# Patient Record
Sex: FEMALE | Race: WHITE | Employment: OTHER | URBAN - METROPOLITAN AREA
[De-identification: names, ages, dates, MRNs, and addresses within clinical notes are randomized per-mention and may not be internally consistent; named-entity substitution may affect disease eponyms.]

---

## 2018-02-06 PROCEDURE — 82746 ASSAY OF FOLIC ACID SERUM: CPT | Performed by: OTHER

## 2018-02-06 PROCEDURE — 82164 ANGIOTENSIN I ENZYME TEST: CPT | Performed by: OTHER

## 2018-02-06 PROCEDURE — 86225 DNA ANTIBODY NATIVE: CPT | Performed by: OTHER

## 2018-02-06 PROCEDURE — 86235 NUCLEAR ANTIGEN ANTIBODY: CPT | Performed by: OTHER

## 2018-02-06 PROCEDURE — 86618 LYME DISEASE ANTIBODY: CPT | Performed by: OTHER

## 2018-02-06 PROCEDURE — 86780 TREPONEMA PALLIDUM: CPT | Performed by: OTHER

## 2018-02-06 PROCEDURE — 82607 VITAMIN B-12: CPT | Performed by: OTHER

## 2018-04-05 PROBLEM — G31.84 MCI (MILD COGNITIVE IMPAIRMENT): Status: ACTIVE | Noted: 2018-04-05

## 2018-06-21 PROBLEM — F90.9 ATTENTION DEFICIT HYPERACTIVITY DISORDER (ADHD), UNSPECIFIED ADHD TYPE: Status: ACTIVE | Noted: 2018-06-21

## 2019-12-10 ENCOUNTER — TELEPHONE (OUTPATIENT)
Dept: NEPHROLOGY | Facility: CLINIC | Age: 54
End: 2019-12-10

## 2020-08-03 ENCOUNTER — HOSPITAL ENCOUNTER (OUTPATIENT)
Dept: GENERAL RADIOLOGY | Age: 55
Discharge: HOME | End: 2020-08-03
Attending: PHYSICAL MEDICINE & REHABILITATION
Payer: MEDICARE

## 2020-08-03 ENCOUNTER — OFFICE VISIT (OUTPATIENT)
Dept: NEUROLOGY | Facility: CLINIC | Age: 55
End: 2020-08-03
Payer: MEDICARE

## 2020-08-03 VITALS — WEIGHT: 141 LBS | HEIGHT: 67 IN | BODY MASS INDEX: 22.13 KG/M2

## 2020-08-03 DIAGNOSIS — G89.29 CHRONIC MIDLINE THORACIC BACK PAIN: ICD-10-CM

## 2020-08-03 DIAGNOSIS — F32.A DEPRESSION, UNSPECIFIED DEPRESSION TYPE: ICD-10-CM

## 2020-08-03 DIAGNOSIS — C50.919 MALIGNANT NEOPLASM OF FEMALE BREAST, UNSPECIFIED ESTROGEN RECEPTOR STATUS, UNSPECIFIED LATERALITY, UNSPECIFIED SITE OF BREAST (HCC): ICD-10-CM

## 2020-08-03 DIAGNOSIS — F90.9 ATTENTION DEFICIT HYPERACTIVITY DISORDER (ADHD), UNSPECIFIED ADHD TYPE: ICD-10-CM

## 2020-08-03 DIAGNOSIS — F41.9 ANXIETY: ICD-10-CM

## 2020-08-03 DIAGNOSIS — M54.6 CHRONIC MIDLINE THORACIC BACK PAIN: ICD-10-CM

## 2020-08-03 DIAGNOSIS — M47.816 LUMBAR SPONDYLOSIS: Primary | ICD-10-CM

## 2020-08-03 DIAGNOSIS — K25.7 CHRONIC GASTRIC ULCER WITHOUT HEMORRHAGE AND WITHOUT PERFORATION: ICD-10-CM

## 2020-08-03 DIAGNOSIS — G47.00 INSOMNIA, UNSPECIFIED TYPE: ICD-10-CM

## 2020-08-03 DIAGNOSIS — M47.816 LUMBAR SPONDYLOSIS: ICD-10-CM

## 2020-08-03 DIAGNOSIS — I25.10 CORONARY ARTERY DISEASE INVOLVING NATIVE CORONARY ARTERY OF NATIVE HEART WITHOUT ANGINA PECTORIS: ICD-10-CM

## 2020-08-03 PROBLEM — G43.709 CHRONIC MIGRAINE WITHOUT AURA: Status: ACTIVE | Noted: 2020-08-03

## 2020-08-03 PROBLEM — Z87.891 HISTORY OF TOBACCO USE: Status: ACTIVE | Noted: 2020-08-03

## 2020-08-03 PROBLEM — K25.9 GASTRIC ULCER: Status: ACTIVE | Noted: 2020-08-03

## 2020-08-03 PROCEDURE — 99204 OFFICE O/P NEW MOD 45 MIN: CPT | Performed by: PHYSICAL MEDICINE & REHABILITATION

## 2020-08-03 PROCEDURE — 72072 X-RAY EXAM THORAC SPINE 3VWS: CPT | Performed by: PHYSICAL MEDICINE & REHABILITATION

## 2020-08-03 PROCEDURE — 72100 X-RAY EXAM L-S SPINE 2/3 VWS: CPT | Performed by: PHYSICAL MEDICINE & REHABILITATION

## 2020-08-03 RX ORDER — ACETAMINOPHEN AND CODEINE PHOSPHATE 300; 30 MG/1; MG/1
1 TABLET ORAL 3 TIMES DAILY PRN
COMMUNITY
Start: 2020-05-22 | End: 2020-08-03

## 2020-08-03 RX ORDER — DEXTROAMPHETAMINE SACCHARATE, AMPHETAMINE ASPARTATE, DEXTROAMPHETAMINE SULFATE AND AMPHETAMINE SULFATE 5; 5; 5; 5 MG/1; MG/1; MG/1; MG/1
1 TABLET ORAL 2 TIMES DAILY
COMMUNITY
Start: 2020-07-14

## 2020-08-03 RX ORDER — AMITRIPTYLINE HYDROCHLORIDE 25 MG/1
25 TABLET, FILM COATED ORAL NIGHTLY
COMMUNITY
End: 2020-08-03

## 2020-08-03 RX ORDER — FAMOTIDINE 20 MG/1
TABLET ORAL
COMMUNITY
Start: 2020-04-07 | End: 2020-08-03

## 2020-08-03 RX ORDER — LIDOCAINE 5 %
ADHESIVE PATCH, MEDICATED TOPICAL
COMMUNITY
Start: 2020-07-06

## 2020-08-03 RX ORDER — LORAZEPAM 1 MG/1
1 TABLET ORAL DAILY
COMMUNITY
Start: 2012-01-18 | End: 2020-08-03

## 2020-08-03 RX ORDER — RIVASTIGMINE 9.5 MG/24H
PATCH, EXTENDED RELEASE TRANSDERMAL
COMMUNITY
Start: 2018-06-22 | End: 2020-08-03

## 2020-08-03 RX ORDER — MONTELUKAST SODIUM 10 MG/1
10 TABLET ORAL NIGHTLY
COMMUNITY
Start: 2020-03-05

## 2020-08-03 RX ORDER — TRAZODONE HYDROCHLORIDE 100 MG/1
TABLET ORAL
COMMUNITY
Start: 2020-05-26

## 2020-08-03 RX ORDER — LEVOTHYROXINE SODIUM 112 MCG
112 TABLET ORAL DAILY
COMMUNITY
Start: 2020-07-14

## 2020-08-03 RX ORDER — MECLIZINE HYDROCHLORIDE 25 MG/1
25 TABLET ORAL 3 TIMES DAILY
COMMUNITY
Start: 2020-03-23

## 2020-08-03 RX ORDER — L-METHYLFOLATE-ALGAE-VIT B12-B6 CAP 3-90.314-2-35 MG 3-90.314-2-35 MG
CAP ORAL
COMMUNITY
End: 2020-08-03

## 2020-08-03 RX ORDER — BUTALBITAL/ASPIRIN/CAFFEINE 50-325-40
1 CAPSULE ORAL
COMMUNITY

## 2020-08-03 RX ORDER — BUPROPION HYDROCHLORIDE 75 MG/1
75 TABLET ORAL DAILY
COMMUNITY
Start: 2020-04-26 | End: 2020-08-03

## 2020-08-03 RX ORDER — OSELTAMIVIR PHOSPHATE 75 MG/1
75 CAPSULE ORAL 2 TIMES DAILY
COMMUNITY
Start: 2020-03-12

## 2020-08-03 RX ORDER — ASPIRIN 81 MG/1
TABLET, CHEWABLE ORAL
COMMUNITY
Start: 2018-10-05

## 2020-08-03 RX ORDER — DULOXETIN HYDROCHLORIDE 60 MG/1
60 CAPSULE, DELAYED RELEASE ORAL DAILY
COMMUNITY
End: 2020-08-03

## 2020-08-03 RX ORDER — AMLODIPINE BESYLATE 5 MG/1
5 TABLET ORAL DAILY
COMMUNITY
End: 2020-08-03 | Stop reason: SINTOL

## 2020-08-03 RX ORDER — HYDROCHLOROTHIAZIDE 25 MG/1
25 TABLET ORAL EVERY MORNING
COMMUNITY
Start: 2020-06-08

## 2020-08-03 RX ORDER — CLONAZEPAM 0.5 MG/1
TABLET ORAL 2 TIMES DAILY
COMMUNITY
Start: 2020-03-20

## 2020-08-03 RX ORDER — CYCLOBENZAPRINE HCL 10 MG
10 TABLET ORAL 3 TIMES DAILY
COMMUNITY
Start: 2020-05-12

## 2020-08-03 RX ORDER — HYDROCODONE BITARTRATE AND IBUPROFEN 7.5; 2 MG/1; MG/1
TABLET, FILM COATED ORAL
COMMUNITY
Start: 2020-07-14 | End: 2020-08-03

## 2020-08-03 RX ORDER — LUBIPROSTONE 24 UG/1
CAPSULE, GELATIN COATED ORAL
COMMUNITY
Start: 2015-01-16

## 2020-08-03 RX ORDER — PROPRANOLOL HYDROCHLORIDE 10 MG/1
TABLET ORAL
COMMUNITY
Start: 2020-06-29

## 2020-08-03 RX ORDER — NIFEDIPINE 30 MG/1
30 TABLET, EXTENDED RELEASE ORAL DAILY
COMMUNITY
Start: 2020-04-02 | End: 2020-08-03

## 2020-08-03 RX ORDER — FLUTICASONE PROPIONATE 50 MCG
SPRAY, SUSPENSION (ML) NASAL
COMMUNITY
Start: 2020-05-12

## 2020-08-03 NOTE — PROGRESS NOTES
[address]  Progress Note    CHIEF COMPLAINT:  Patient presents with:  Low Back Pain: New patient from previous office, presents for injection evaluation.        History of Present Illness:  Reford Lobe Smoking status: Current Every Day Smoker        Packs/day: 0.50      Smokeless tobacco: Never Used    Substance and Sexual Activity      Alcohol use: Yes        Comment: occasional      Drug use: No      Sexual activity: Not on file      FAMILY HISTORY: • Meclizine HCl 25 MG Oral Tab Take 25 mg by mouth 3 (three) times daily. take 88 mcg by mouth. • Montelukast Sodium 10 MG Oral Tab Take 10 mg by mouth nightly. • Oseltamivir Phosphate 75 MG Oral Cap Take 75 mg by mouth 2 (two) times daily.      • deformities  Heart: peripheral pulses intact. Normal capillary refill. Lungs: Non-labored respirations  Extremities: No lower extremity edema bilaterally   Skin: No lesions noted. Spine: Tender to palpation in the lumbar paraspinals and midline.   No pe unspecified type      RTC:    Return in about 4 weeks (around 8/31/2020). Discharge Instructions were provided as documented in AVS summary. The patient was in agreement with the assessment and plan. All questions were answered.   There were no bar

## 2020-08-04 ENCOUNTER — TELEPHONE (OUTPATIENT)
Dept: NEUROLOGY | Facility: CLINIC | Age: 55
End: 2020-08-04

## 2020-08-04 DIAGNOSIS — M47.816 LUMBAR SPONDYLOSIS: Primary | ICD-10-CM

## 2020-08-04 NOTE — TELEPHONE ENCOUNTER
Pt has been rescheduled for 8/13/20. Pt would like later time slot if available - marked on scheduling form accordingly. Updated [facility] form has been faxed.      Per pt, she asks why injections are not being done on Thoracic spine as this area is more painful

## 2020-08-04 NOTE — TELEPHONE ENCOUNTER
Spoke to patient and informed her of Dr. Juventino Coats message and interpretation of the imaging results.    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

## 2020-08-04 NOTE — TELEPHONE ENCOUNTER
Patient was scheduled for Bilateral L34, L45 and L5-S1 facet injections with sedation on Thursday, August 6, 2020. Medications and allergies reviewed. Patient informed she will need a .  Patient informed not to eat or drink anything after midnight t

## 2020-08-04 NOTE — TELEPHONE ENCOUNTER
----- Message from Rakesh Epps MD sent at 8/4/2020 11:13 AM CDT -----  I personally reviewed a plain film of the thoracic spine showing minimal scoliotic curvature and widespread disc degeneration. Also some kyphosis.     Please of the patient know t

## 2020-08-04 NOTE — TELEPHONE ENCOUNTER
LMTCB - Per [facility], pt will need to be rescheduled. Unable to accommodate d/t inadequate time to coordinate COVID testing.

## 2020-08-05 NOTE — TELEPHONE ENCOUNTER
Last time she had injections in the thoracic area she had significant complications. That is why we will work on her lumbar spine.

## 2020-08-05 NOTE — TELEPHONE ENCOUNTER
Pt informed of response per Dr. Randee Amezcua, states that was with a different doctor who gave the thoracic injections previously.  Asked if she had complications with thoracic injections with previous physician, pt stated \"well yes, but the last physician who in

## 2020-08-10 ENCOUNTER — LAB REQUISITION (OUTPATIENT)
Dept: LAB | Facility: HOSPITAL | Age: 55
End: 2020-08-10
Payer: MEDICARE

## 2020-08-10 DIAGNOSIS — Z20.828 CONTACT WITH AND (SUSPECTED) EXPOSURE TO OTHER VIRAL COMMUNICABLE DISEASES: ICD-10-CM

## 2020-08-12 LAB — SARS-COV-2 RNA RESP QL NAA+PROBE: NOT DETECTED

## 2020-08-13 ENCOUNTER — OFFICE VISIT (OUTPATIENT)
Dept: SURGERY | Facility: CLINIC | Age: 55
End: 2020-08-13

## 2020-08-13 DIAGNOSIS — M47.816 LUMBAR SPONDYLOSIS: Primary | ICD-10-CM

## 2020-08-13 NOTE — TELEPHONE ENCOUNTER
[id number] Anai Todd will need a follow up to assess her thoracic spine so we know what to inject. Pls have her set up an appt.  Thx

## 2020-08-13 NOTE — TELEPHONE ENCOUNTER
MD SARA Alexis Lose             Case cancelled due to patient GI illness. Please call to reschedule. Thanks. Rodrigo Schuler Spoke to patient and cancelled injection for this morning.  She states that she was throwing up last night and has a low g

## 2020-08-28 ENCOUNTER — MED REC SCAN ONLY (OUTPATIENT)
Dept: NEUROLOGY | Facility: CLINIC | Age: 55
End: 2020-08-28

## 2021-03-20 DIAGNOSIS — Z23 NEED FOR VACCINATION: ICD-10-CM

## 2022-10-05 ENCOUNTER — OFFICE VISIT (OUTPATIENT)
Dept: ENDOCRINOLOGY CLINIC | Facility: CLINIC | Age: 57
End: 2022-10-05
Payer: MEDICARE

## 2022-10-05 ENCOUNTER — LAB ENCOUNTER (OUTPATIENT)
Dept: LAB | Age: 57
End: 2022-10-05
Attending: INTERNAL MEDICINE
Payer: MEDICARE

## 2022-10-05 VITALS
WEIGHT: 148 LBS | DIASTOLIC BLOOD PRESSURE: 78 MMHG | SYSTOLIC BLOOD PRESSURE: 136 MMHG | BODY MASS INDEX: 23 KG/M2 | HEART RATE: 60 BPM

## 2022-10-05 DIAGNOSIS — E89.0 POSTOPERATIVE HYPOTHYROIDISM: ICD-10-CM

## 2022-10-05 DIAGNOSIS — E89.0 POSTOPERATIVE HYPOTHYROIDISM: Primary | ICD-10-CM

## 2022-10-05 DIAGNOSIS — R73.01 IMPAIRED FASTING GLUCOSE: ICD-10-CM

## 2022-10-05 LAB
CARTRIDGE LOT#: NORMAL NUMERIC
CORTIS SERPL-MCNC: 5.9 UG/DL
HEMOGLOBIN A1C: 5.6 % (ref 4.3–5.6)
T3FREE SERPL-MCNC: 2.09 PG/ML (ref 2.4–4.2)
T4 FREE SERPL-MCNC: 1.4 NG/DL (ref 0.8–1.7)
THYROPEROXIDASE AB SERPL-ACNC: 30 U/ML (ref ?–60)
TSI SER-ACNC: 0.79 MIU/ML (ref 0.36–3.74)

## 2022-10-05 PROCEDURE — 82533 TOTAL CORTISOL: CPT

## 2022-10-05 PROCEDURE — 86376 MICROSOMAL ANTIBODY EACH: CPT

## 2022-10-05 PROCEDURE — 99204 OFFICE O/P NEW MOD 45 MIN: CPT | Performed by: INTERNAL MEDICINE

## 2022-10-05 PROCEDURE — 83036 HEMOGLOBIN GLYCOSYLATED A1C: CPT | Performed by: INTERNAL MEDICINE

## 2022-10-05 PROCEDURE — 36415 COLL VENOUS BLD VENIPUNCTURE: CPT

## 2022-10-05 PROCEDURE — 84439 ASSAY OF FREE THYROXINE: CPT

## 2022-10-05 PROCEDURE — 84443 ASSAY THYROID STIM HORMONE: CPT

## 2022-10-05 PROCEDURE — 84481 FREE ASSAY (FT-3): CPT

## 2022-10-05 RX ORDER — LEVOTHYROXINE SODIUM 125 MCG
125 TABLET ORAL DAILY
COMMUNITY
Start: 2022-09-12 | End: 2022-10-07

## 2022-10-06 ENCOUNTER — TELEPHONE (OUTPATIENT)
Dept: ENDOCRINOLOGY CLINIC | Facility: CLINIC | Age: 57
End: 2022-10-06

## 2022-10-06 DIAGNOSIS — E89.0 POSTOPERATIVE HYPOTHYROIDISM: Primary | ICD-10-CM

## 2022-10-06 NOTE — TELEPHONE ENCOUNTER
Please call patient - thyroid labs are normal however I know she was having more symptoms. Change Synthroid to 112mcg PO every day. Start Liothyronine 5mcg PO daily #90, refill 1 in addition to Synthroid. Recheck TSH, FT4, FT3 in 3 months. Thanks.

## 2022-10-06 NOTE — TELEPHONE ENCOUNTER
Dr. Sasha Hamlin to patient regarding result note below. Patient wants to know why her \"recipe\" has changed? I told her lab work is normal. She is wondering if she can stick with:    Synthroid 125 mcg Monday, Thursday Sunday,   Synthroid 112 mcg Tuesday, Wednesday, Friday, Saturday. With Cytomel 5 mcg in addition    She also wants to know what you think of her Cortisol level? I told her this was also normal. She wants to know if it's possible that she has an adrenal tumor. Please advise.

## 2022-10-06 NOTE — TELEPHONE ENCOUNTER
The cortisol level was normal.  I don't see any evidence of an adrenal tumor at this time. Yes, ok to add liothyronine to current recipe and then repeat labs. Thanks.

## 2022-10-07 RX ORDER — LIOTHYRONINE SODIUM 5 UG/1
5 TABLET ORAL DAILY
Qty: 90 TABLET | Refills: 1 | Status: SHIPPED | OUTPATIENT
Start: 2022-10-07

## 2022-10-07 RX ORDER — LEVOTHYROXINE SODIUM 112 MCG
112 TABLET ORAL
Qty: 90 TABLET | Refills: 0 | Status: SHIPPED | OUTPATIENT
Start: 2022-10-07

## 2022-10-07 NOTE — TELEPHONE ENCOUNTER
Spoke to patient regarding message below. Patient stated she changed her mind and wants to try the regimen that Dr. Yadira Benavides originally had suggested in first note below. Prescription sent, and lab orders placed. Patient stated she will contact office if she starts experiencing worsening symptoms.

## 2022-10-20 ENCOUNTER — TELEPHONE (OUTPATIENT)
Dept: ENDOCRINOLOGY CLINIC | Facility: CLINIC | Age: 57
End: 2022-10-20

## 2022-10-20 NOTE — TELEPHONE ENCOUNTER
[facility] fax [facility], attached is lab results for TSH.  Placed on Dr [employer] desk for review

## 2022-11-14 ENCOUNTER — LAB ENCOUNTER (OUTPATIENT)
Dept: LAB | Age: 57
End: 2022-11-14
Attending: INTERNAL MEDICINE
Payer: MEDICARE

## 2022-11-14 ENCOUNTER — TELEPHONE (OUTPATIENT)
Dept: ENDOCRINOLOGY CLINIC | Facility: CLINIC | Age: 57
End: 2022-11-14

## 2022-11-14 DIAGNOSIS — E89.0 POSTOPERATIVE HYPOTHYROIDISM: ICD-10-CM

## 2022-11-14 DIAGNOSIS — E89.0 POSTOPERATIVE HYPOTHYROIDISM: Primary | ICD-10-CM

## 2022-11-14 LAB
T3FREE SERPL-MCNC: 1.97 PG/ML (ref 2.4–4.2)
T4 FREE SERPL-MCNC: 1.4 NG/DL (ref 0.8–1.7)
TSI SER-ACNC: 0.58 MIU/ML (ref 0.36–3.74)

## 2022-11-14 PROCEDURE — 36415 COLL VENOUS BLD VENIPUNCTURE: CPT

## 2022-11-14 PROCEDURE — 84439 ASSAY OF FREE THYROXINE: CPT

## 2022-11-14 PROCEDURE — 84443 ASSAY THYROID STIM HORMONE: CPT

## 2022-11-14 PROCEDURE — 84481 FREE ASSAY (FT-3): CPT

## 2022-11-14 RX ORDER — LEVOTHYROXINE SODIUM 0.12 MG/1
TABLET ORAL
Qty: 90 TABLET | Refills: 0 | Status: SHIPPED | OUTPATIENT
Start: 2022-11-14

## 2022-11-14 RX ORDER — LEVOTHYROXINE SODIUM 112 UG/1
TABLET ORAL
Qty: 90 TABLET | Refills: 0 | Status: SHIPPED | OUTPATIENT
Start: 2022-11-14

## 2022-11-14 NOTE — TELEPHONE ENCOUNTER
Ok, noted. Please stop Liothyronine. Go back to her previous regimen which was Synthroid 125mcg 3 days per week and 112mcg 4 days per week. Ok to check TSH, FT4, FT3.  Thanks

## 2022-11-14 NOTE — TELEPHONE ENCOUNTER
Dr. Rigo Boothe    Per TE 10/20:    Vivian Azevedo, noted. Lets decrease Levothyroxine to 100mcg PO daily #90, refill 1 and repeat TSH, FT4, FT3 in 6 weeks. Ok to continue liothyronine. However in short term hold joseline for one week to improve symptoms. Spoke to patient. Patient stated she is not feeling well. She stated symptoms have gotten worse. She stated the above medication regimen didn't help her. She stated she is having heart issues--wants to know if this is related to Cytomel? She stated she has been out of breath and fatigued--she stated she does not have any energy. She will see her cardiologist on Thursday. She stated she is taking Levothyroxine 100 mg daily  Liothyronine 5 mcg--she stated this again yesterday, she stated she felt bad after taking. Patient wants to know if she can have TSH level drawn sooner than recommended. Patient stated she is convinced this is thyroid related. Patient stated she needs to be called/seen before 4 pm due to vision issues.

## 2022-11-14 NOTE — TELEPHONE ENCOUNTER
Spoke to patient. Verbalized understanding. Lab orders placed. Patient will get labs today and patient requested rx to be sent to pharmacy. Rx sent.

## 2023-01-04 RX ORDER — LIOTHYRONINE SODIUM 5 UG/1
TABLET ORAL
Qty: 90 TABLET | Refills: 1 | OUTPATIENT
Start: 2023-01-04

## 2023-01-04 NOTE — TELEPHONE ENCOUNTER
LOV: 10/5/22    RTC: 1 Year    FU: No FU Appt Scheduled    Last Refill: 10/7/22    Per TE on 11/14/22, Shai Givens MD [facility], noted.   Please stop Liothyronine\"      Please refuse if appropriate
Constitutional: (-) fever   Head: Normal cephalic, Atraumatic  Eyes/ENT: (-) vision changes  Cardiovascular: (-) chest pain, (-) wheezing  Respiratory: (-) cough, (-) shortness of breath  Gastrointestinal: (-) vomiting, (-) diarrhea, (-) abdominal pain  : (-) dysuria   Musculoskeletal: (-) back pain  Integumentary: (+) rash, (-) edema  Neurological: (-)loc  Allergic/Immunologic: (-) pruritus

## 2023-02-14 RX ORDER — LEVOTHYROXINE SODIUM 112 UG/1
TABLET ORAL
Qty: 90 TABLET | Refills: 0 | Status: SHIPPED | OUTPATIENT
Start: 2023-02-14 | End: 2023-02-15

## 2023-02-15 ENCOUNTER — OFFICE VISIT (OUTPATIENT)
Dept: ENDOCRINOLOGY CLINIC | Facility: CLINIC | Age: 58
End: 2023-02-15

## 2023-02-15 ENCOUNTER — LAB ENCOUNTER (OUTPATIENT)
Dept: LAB | Age: 58
End: 2023-02-15
Attending: INTERNAL MEDICINE
Payer: MEDICARE

## 2023-02-15 VITALS
SYSTOLIC BLOOD PRESSURE: 156 MMHG | HEART RATE: 64 BPM | DIASTOLIC BLOOD PRESSURE: 91 MMHG | BODY MASS INDEX: 23 KG/M2 | WEIGHT: 145 LBS

## 2023-02-15 DIAGNOSIS — R73.01 IMPAIRED FASTING GLUCOSE: ICD-10-CM

## 2023-02-15 DIAGNOSIS — E89.0 POSTOPERATIVE HYPOTHYROIDISM: Primary | ICD-10-CM

## 2023-02-15 DIAGNOSIS — E89.0 POSTOPERATIVE HYPOTHYROIDISM: ICD-10-CM

## 2023-02-15 LAB
ANION GAP SERPL CALC-SCNC: 7 MMOL/L (ref 0–18)
BUN BLD-MCNC: 9 MG/DL (ref 7–18)
BUN/CREAT SERPL: 13.2 (ref 10–20)
CALCIUM BLD-MCNC: 9 MG/DL (ref 8.5–10.1)
CHLORIDE SERPL-SCNC: 103 MMOL/L (ref 98–112)
CO2 SERPL-SCNC: 26 MMOL/L (ref 21–32)
CREAT BLD-MCNC: 0.68 MG/DL
EST. AVERAGE GLUCOSE BLD GHB EST-MCNC: 123 MG/DL (ref 68–126)
FASTING STATUS PATIENT QL REPORTED: YES
GFR SERPLBLD BASED ON 1.73 SQ M-ARVRAT: 100 ML/MIN/1.73M2 (ref 60–?)
GLUCOSE BLD-MCNC: 99 MG/DL (ref 70–99)
HBA1C MFR BLD: 5.9 % (ref ?–5.7)
OSMOLALITY SERPL CALC.SUM OF ELEC: 281 MOSM/KG (ref 275–295)
POTASSIUM SERPL-SCNC: 4 MMOL/L (ref 3.5–5.1)
SODIUM SERPL-SCNC: 136 MMOL/L (ref 136–145)
T3FREE SERPL-MCNC: 1.91 PG/ML (ref 2.4–4.2)
T4 FREE SERPL-MCNC: 1.4 NG/DL (ref 0.8–1.7)
TSI SER-ACNC: 0.83 MIU/ML (ref 0.36–3.74)

## 2023-02-15 PROCEDURE — 84443 ASSAY THYROID STIM HORMONE: CPT

## 2023-02-15 PROCEDURE — 99214 OFFICE O/P EST MOD 30 MIN: CPT | Performed by: INTERNAL MEDICINE

## 2023-02-15 PROCEDURE — 36415 COLL VENOUS BLD VENIPUNCTURE: CPT

## 2023-02-15 PROCEDURE — 84481 FREE ASSAY (FT-3): CPT

## 2023-02-15 PROCEDURE — 84439 ASSAY OF FREE THYROXINE: CPT

## 2023-02-15 PROCEDURE — 83036 HEMOGLOBIN GLYCOSYLATED A1C: CPT

## 2023-02-15 PROCEDURE — 80048 BASIC METABOLIC PNL TOTAL CA: CPT

## 2023-02-15 RX ORDER — LEVOTHYROXINE SODIUM 125 MCG
TABLET ORAL
Qty: 90 TABLET | Refills: 2 | Status: SHIPPED | OUTPATIENT
Start: 2023-02-15

## 2023-02-15 RX ORDER — LEVOTHYROXINE SODIUM 112 MCG
TABLET ORAL
Qty: 90 TABLET | Refills: 2 | Status: SHIPPED | OUTPATIENT
Start: 2023-02-15

## 2023-02-17 ENCOUNTER — TELEPHONE (OUTPATIENT)
Dept: ENDOCRINOLOGY CLINIC | Facility: CLINIC | Age: 58
End: 2023-02-17

## 2023-02-20 NOTE — TELEPHONE ENCOUNTER
Labs results reviewed and they are stable. Will defer to Dr. Nolvia Johnson upon her return on 2/27. Thank you!

## 2023-02-24 NOTE — TELEPHONE ENCOUNTER
Sorry for delay while I was out of the office last week. Her thyroid levels are stable and at goal.  Please continue current dose of thyroid hormone. Thank you.

## 2023-03-03 NOTE — TELEPHONE ENCOUNTER
Informed patient of doctors response. Patient is stating her Free T3 and TSH should not be that low. Informed again that Dr. Rigo Boothe states labs were stable and at goal, but will ask if anything can be done about her low T3 and TSH. Please advise.

## 2023-03-03 NOTE — TELEPHONE ENCOUNTER
At her last visit I tried to add T3 supplementation but she didn't tolerate well so we changed back to her previous medication. Her TSH level is normal but if she wanted to increase this level then we would decrease the dose. Ok to change Synthroid to 125mcg 3 days per week and 112mcg 4 days per week.

## 2023-07-10 ENCOUNTER — TELEPHONE (OUTPATIENT)
Dept: ENDOCRINOLOGY CLINIC | Facility: CLINIC | Age: 58
End: 2023-07-10

## 2023-07-10 DIAGNOSIS — E89.0 POSTOPERATIVE HYPOTHYROIDISM: Primary | ICD-10-CM

## 2023-07-10 DIAGNOSIS — L65.9 HAIR LOSS: ICD-10-CM

## 2023-07-10 DIAGNOSIS — I25.10 CORONARY ARTERY DISEASE INVOLVING NATIVE CORONARY ARTERY OF NATIVE HEART WITHOUT ANGINA PECTORIS: ICD-10-CM

## 2023-07-10 DIAGNOSIS — R73.01 IMPAIRED FASTING GLUCOSE: ICD-10-CM

## 2023-07-10 NOTE — TELEPHONE ENCOUNTER
Contacted patient and relayed Dr. Iggy Bartholomew message below. Pt voiced understanding on calling PCP if c-reactive protein and iron labs are abnormal.     Dr. Pamella Green -- please see pended lab orders for accuracy. RN placed DX that would be covered by Medicare. Please route back to RN pool to fax lab orders. Thank you.

## 2023-07-10 NOTE — TELEPHONE ENCOUNTER
Dr. Milan Hudson,  Patient asking to also check C-reactive protein and iron labs - please advise -thanks     TSH, T4 and T3 labs ordered - patient would [facility] to [facility] in [place] (fax # [phone])

## 2023-07-10 NOTE — TELEPHONE ENCOUNTER
I'm ok to add those labs but please let her know if results are abnormal then will need to follow up with PCP. Thanks.

## 2023-07-10 NOTE — TELEPHONE ENCOUNTER
RN faxed lab orders via [employer] system to the fax number listed by previous RN (confirmed it's the correct fax number)

## 2023-07-10 NOTE — TELEPHONE ENCOUNTER
Dr. Sonny Billings,  See message below  Labs last done on 2/15/23:  Component      Latest Ref Rng 2/15/2023   TSH      0.358 - 3.740 mIU/mL 0.826    T4,Free (Direct)      0.8 - 1.7 ng/dL 1.4      Component      Latest Ref Rng 2/15/2023   T3 FREE      2.40 - 4.20 pg/mL 1.91 (L)     patient taking:   Synthroid 112mcg 5 days/week  Synthroid 125mcg 2 days/week    Please advise if ok to check labs -thanks

## 2023-07-10 NOTE — TELEPHONE ENCOUNTER
Néstor'latoya Benavides - ok to add A1c order - A1c [facility] at [phone]

## 2023-07-10 NOTE — TELEPHONE ENCOUNTER
Patient calling regards concerns losing voice and hair, states that hair is coming out in clumps like if pt was in \"chemo\", states would like to know if this is in relation to thyroid. Please call. Pt concerned.

## 2023-11-22 ENCOUNTER — TELEPHONE (OUTPATIENT)
Dept: ENDOCRINOLOGY CLINIC | Facility: CLINIC | Age: 58
End: 2023-11-22

## 2023-11-22 DIAGNOSIS — E89.0 POSTOPERATIVE HYPOTHYROIDISM: ICD-10-CM

## 2023-11-22 DIAGNOSIS — Z00.00 HEALTHCARE MAINTENANCE: ICD-10-CM

## 2023-11-22 DIAGNOSIS — R73.01 IMPAIRED FASTING GLUCOSE: Primary | ICD-10-CM

## 2023-11-22 NOTE — TELEPHONE ENCOUNTER
Patient calling regards orders states [facility] cannot see them, states as well if can add for potassium and sodium. Please call and advise.

## 2023-12-04 NOTE — TELEPHONE ENCOUNTER
Dr. Srikanth Johnson    Will call patient to see where she wants orders faxed - ok to add on additional orders?

## 2023-12-05 NOTE — TELEPHONE ENCOUNTER
Left message to call back to ask where she needs labs orders sent (fax number?) She also needs to schedule FU with Dr. Nolvia Johnson. LOV 2/15/23. BMP ordered.

## 2023-12-07 DIAGNOSIS — E89.0 POSTOPERATIVE HYPOTHYROIDISM: ICD-10-CM

## 2023-12-07 RX ORDER — LEVOTHYROXINE SODIUM 125 MCG
TABLET ORAL
Qty: 30 TABLET | Refills: 0 | Status: SHIPPED | OUTPATIENT
Start: 2023-12-07

## 2023-12-07 RX ORDER — LEVOTHYROXINE SODIUM 112 MCG
TABLET ORAL
Qty: 30 TABLET | Refills: 0 | Status: SHIPPED | OUTPATIENT
Start: 2023-12-07

## 2023-12-07 NOTE — TELEPHONE ENCOUNTER
Patient calling regards refill request, please call.        SYNTHROID 125 MCG Oral Tab       SYNTHROID 112 MCG Oral Tab

## 2023-12-07 NOTE — TELEPHONE ENCOUNTER
Left message to call back. We do not have way of \"sending to [facility]. \" We do not do standing orders. We can mail her the orders or send through [address] Please schedule FU appt with Dr. Kristopher Yao also.

## 2023-12-07 NOTE — TELEPHONE ENCOUNTER
Patient calling to follow up, states to be sent to [facility]. Patient states she does not have fax number and \"had a standing order\" Could not provide any additional information.

## 2023-12-07 NOTE — TELEPHONE ENCOUNTER
LOV 2/15/23, RTC 3 months  No FU scheduled. Left message to call back to schedule appt and to ask if she had labs done. 30 day supply pended.

## 2023-12-11 NOTE — TELEPHONE ENCOUNTER
Spoke with patient did not have fax number but request we contact [facility] immediate care- [address] [facility]  [phone] to obtain fax number. Pt booked f/u on 2/13/24 @1:30 in [facility]. Called phone number provided and fax is [phone]. Please fax orders for pt.

## 2023-12-14 DIAGNOSIS — E89.0 POSTOPERATIVE HYPOTHYROIDISM: ICD-10-CM

## 2023-12-14 RX ORDER — LEVOTHYROXINE SODIUM 112 MCG
TABLET ORAL
Qty: 30 TABLET | Refills: 0 | Status: SHIPPED | OUTPATIENT
Start: 2023-12-14

## 2023-12-14 NOTE — TELEPHONE ENCOUNTER
LOV: 2/15/23     Next office visit: 2/14/24     Last filled: 12/7/23  Refill request: SYNTHROID 112MCG ORAL TAB     Order pended and routed to Dr. Marilee Millan

## 2023-12-19 ENCOUNTER — TELEPHONE (OUTPATIENT)
Dept: ENDOCRINOLOGY CLINIC | Facility: CLINIC | Age: 58
End: 2023-12-19

## 2023-12-19 NOTE — TELEPHONE ENCOUNTER
Pt is calling to see if Thyroid medication will remain same or need adjusting. Pt states that she had [place] [facility] on 12[place]15/23.   Please call

## 2023-12-20 NOTE — TELEPHONE ENCOUNTER
Received fax from [facility] attached is pt recent lab results collected on 12/15/23, results placed in folder for review.

## 2024-02-15 ENCOUNTER — OFFICE VISIT (OUTPATIENT)
Dept: ENDOCRINOLOGY CLINIC | Facility: CLINIC | Age: 59
End: 2024-02-15

## 2024-02-15 ENCOUNTER — LAB ENCOUNTER (OUTPATIENT)
Dept: LAB | Facility: HOSPITAL | Age: 59
End: 2024-02-15
Attending: INTERNAL MEDICINE
Payer: MEDICARE

## 2024-02-15 VITALS
WEIGHT: 139 LBS | DIASTOLIC BLOOD PRESSURE: 75 MMHG | BODY MASS INDEX: 22 KG/M2 | HEART RATE: 75 BPM | SYSTOLIC BLOOD PRESSURE: 118 MMHG

## 2024-02-15 DIAGNOSIS — D35.00 ADRENAL ADENOMA, UNSPECIFIED LATERALITY: ICD-10-CM

## 2024-02-15 DIAGNOSIS — E89.0 POSTOPERATIVE HYPOTHYROIDISM: Primary | ICD-10-CM

## 2024-02-15 LAB — CORTIS SERPL-MCNC: 7 UG/DL

## 2024-02-15 PROCEDURE — 82533 TOTAL CORTISOL: CPT

## 2024-02-15 PROCEDURE — 84244 ASSAY OF RENIN: CPT

## 2024-02-15 PROCEDURE — 82024 ASSAY OF ACTH: CPT

## 2024-02-15 PROCEDURE — 36415 COLL VENOUS BLD VENIPUNCTURE: CPT

## 2024-02-15 PROCEDURE — 83835 ASSAY OF METANEPHRINES: CPT

## 2024-02-15 PROCEDURE — 99214 OFFICE O/P EST MOD 30 MIN: CPT | Performed by: INTERNAL MEDICINE

## 2024-02-15 PROCEDURE — 82088 ASSAY OF ALDOSTERONE: CPT

## 2024-02-15 PROCEDURE — 86316 IMMUNOASSAY TUMOR OTHER: CPT

## 2024-02-15 NOTE — PROGRESS NOTES
Name: Ashley Naranjo  Date: 2/15/2024    Referring Physician: No ref. provider found    Chief Complaint   Patient presents with    Hypothyroidism     Patient following up to review thyroid medicaiton/labs. Would also like to review recent CT scans.           HISTORY OF PRESENT ILLNESS   Ashley Naranjo is a 58 year old female who presents for   Chief Complaint   Patient presents with    Hypothyroidism     Patient following up to review thyroid medicaiton/labs. Would also like to review recent CT scans.         #1 Postoperative Hypothyroidism    57 y/o F presents for follow up evaluation of postoperative hypothyroidism.  She underwent total thyroidectomy in 2012 due to Graves Disease.  Prior to surgery she was started on Methimazole but developed significant ocular symptoms therefore referred for surgery.  Final Pathology was benign.      She is followed at [facility] for thyroid eye disease.     She is maintained on Synthroid 125mcg (days per week - M,Th,Sun) and 112mcg 4 days per week.   She is taking medication in Am and waiting 30-60 min before eating.   She is now having more heat intolerance for the past few weeks.       Since last visit she underwent CT scan abdomen per cardiologist which demonstrated bilateral adrenal adenoma.  Her allergist checked chromogranin A which was also elevated per patient.     #2 Impaired Fasting Glucose    58 y/o F presents for follow up evaluation of impaired fasting glucose.     HgA1c 5.6% 11/2022     Strong family h/o DM     REVIEW OF SYSTEMS  Eyes: no change in vision  Neurologic: no headache, generalized or focal weakness or numbness.  Head: normal  ENT: normal  Lungs: no shortness of breath, wheezing or TERRELL  Cardiovascular:  no chest pain or palpitations  Gastrointestinal:  no abdominal pain, bowel movement problems  Musculoskeletal: no muscle pain or arthralgia  /Gyne: no frequency or discomfort while urinating  Psychiatric:  no acute distress, anxiety  or  depression  Skin: normal moisturized skin    Medications:     Current Outpatient Medications:     SYNTHROID 112 MCG Oral Tab, TAKE ONE TABLET BY MOUTH 4 DAYS PER WEEK AS DIRECTED, Disp: 30 tablet, Rfl: 0    SYNTHROID 125 MCG Oral Tab, 125 mcg dose for 3 days per week (Monday, Thursday, Sunday), Disp: 30 tablet, Rfl: 0    PROAIR  (90 Base) MCG/ACT Inhalation Aero Soln, INHALE 2 PUFFS PO Q 4 TO 6 H, Disp: , Rfl:     amphetamine-dextroamphetamine 20 MG Oral Tab, Take 1 tablet by mouth 2 (two) times daily., Disp: , Rfl:     aspirin 81 MG Oral Chew Tab, , Disp: , Rfl:     Butalbital-APAP-Caff-Cod -13-30 MG Oral Cap, Take 1 capsule by mouth., Disp: , Rfl:     clonazePAM 0.5 MG Oral Tab, Take by mouth 2 (two) times daily., Disp: , Rfl:     cyclobenzaprine 10 MG Oral Tab, Take 1 tablet (10 mg total) by mouth 3 (three) times daily. take 88 mcg by mouth., Disp: , Rfl:     Fluticasone Propionate 50 MCG/ACT Nasal Suspension, INSTILL 2 SPRAYS IEN QD, Disp: , Rfl:     hydrochlorothiazide 25 MG Oral Tab, Take 1 tablet (25 mg total) by mouth every morning., Disp: , Rfl:     LIDODERM 5 % External Patch, APPLY 1 PATCH TO LUMBAR AREA Q 24 HOURS, Disp: , Rfl:     lubiprostone 24 MCG Oral Cap, 1 capsule with food, Disp: , Rfl:     Meclizine HCl 25 MG Oral Tab, Take 1 tablet (25 mg total) by mouth 3 (three) times daily. take 88 mcg by mouth., Disp: , Rfl:     Montelukast Sodium 10 MG Oral Tab, Take 1 tablet (10 mg total) by mouth nightly., Disp: , Rfl:     Oseltamivir Phosphate 75 MG Oral Cap, Take 1 capsule (75 mg total) by mouth 2 (two) times daily., Disp: , Rfl:     Propranolol HCl 10 MG Oral Tab, TK 1 T PO BID FOR 2 WKS THEN TK 2 TS PO BID FOR 2 WKS, Disp: , Rfl:     traZODone HCl 100 MG Oral Tab, TK ONE T D HS, Disp: , Rfl:     Vitamin D3 2000 units Oral Cap, Take 1 capsule (2,000 Units total) by mouth daily., Disp: , Rfl:     ALPRAZolam 1 MG Oral Tab, Take 1 tablet (1 mg total) by mouth as needed. 0.5, Disp: , Rfl:      Dicyclomine HCl 20 MG Oral Tab, TK 1 T PO QID BEFORE MEALS AND AT BEDTIME., Disp: , Rfl: 3    Ondansetron HCl (ZOFRAN) 4 mg tablet, TK 1 T PO Q 6 HOURS PRN, Disp: , Rfl: 5    RECTIV 0.4 % Rectal Ointment, INSERT 1 MG RECTALLY BID UTD, Disp: , Rfl: 5    carvedilol 12.5 MG Oral Tab, Take 1 tablet (12.5 mg total) by mouth 2 (two) times daily with meals., Disp: , Rfl:     lisinopril 10 MG Oral Tab, Take 1 tablet (10 mg total) by mouth daily., Disp: , Rfl:     Dexlansoprazole 60 MG Oral Capsule Delayed Release, Take 60 mg by mouth daily., Disp: , Rfl:     Sertraline HCl 100 MG Oral Tab, Take 0.5 tablets (50 mg total) by mouth daily., Disp: , Rfl:      Allergies:   Allergies   Allergen Reactions    Erythromycin DIARRHEA    Latex RASH    Penicillins RASH       Social History:   Social History     Socioeconomic History    Marital status:    Tobacco Use    Smoking status: Every Day     Packs/day: .5     Types: Cigarettes    Smokeless tobacco: Never   Substance and Sexual Activity    Alcohol use: Yes     Comment: occasional    Drug use: No   Other Topics Concern    Caffeine Concern No    Exercise No       Medical History:   Past Medical History:   Diagnosis Date    Anxiety     Aphthous ulcer     Breast cancer (HCC)     CAD (coronary artery disease)     Degenerative disc disease, lumbar     Depression     Essential hypertension     GI bleed     Graves disease     Hyperlipidemia     Hypothyroid     Prediabetes     Trigeminal neuralgia        Surgical history:   Past Surgical History:   Procedure Laterality Date    ANGIOPLASTY (CORONARY)      APPENDECTOMY      HERNIA SURGERY      OTHER SURGICAL HISTORY      thyroidectomy    OTHER SURGICAL HISTORY      left lumpectomy       PHYSICAL EXAMINATION:  /75   Pulse 75   Wt 139 lb (63 kg)   BMI 21.77 kg/m²     General Appearance:  Alert, in no acute distress, well developed  Eyes: normal conjunctivae, sclera.  Ears/Nose/Mouth/Throat/Neck:  normal hearing, normal speech  and absent thyroid gland   Musculoskeletal:  normal muscle strength and tone  PV: normal pulses of carotids, pedals  Skin:  normal moisture and skin texture  Hair & Nails:  normal scalp hair     Neuro:  sensory grossly intact and motor grossly intact  Psychiatric:  oriented to time, self, and place  Nutritional:  no abnormal weight gain or loss    ASSESSMENT/PLAN:    1. Postoperative Hypothyroidism  - Discussed diagnosis   - Discussed nonspecific symptoms of thyroid disease  - Discussed possible T3 and T4 supplementation give persistent symptoms  - Continue current dose of Synthroid  - Normal TFTs   - Attempted Liothyronine at last visit but not effective   - Discussed she is not a candidate for HRT given breast cancer history   - Further management based on above results    2. Impaired Fasting Glucose  - HgA1c 5.8% -->stable  - Discussed low CHO diet  - Recheck HgA1c     3. Adrenal Adenoma  - New diagnosis   - PCP referred to oncology but she was scared to go to visit  - Of note allergist noted high Chromogranin A level  - Check Metanephrines, Cortisol, Aldosterone, Renin, BMP, Chromogranin  - Discussed repeat CT scan in June  - Also discussed possible oncology evaluation     RTC 4 months       2/15/2023  Marcella Avila MD

## 2024-02-18 LAB — ACTH: 3.9 PG/ML

## 2024-02-19 ENCOUNTER — PATIENT MESSAGE (OUTPATIENT)
Dept: ENDOCRINOLOGY CLINIC | Facility: CLINIC | Age: 59
End: 2024-02-19

## 2024-02-19 LAB — CHROMOGRANIN A: 115.7 NG/ML

## 2024-02-19 NOTE — TELEPHONE ENCOUNTER
From: Ashley Naranjo  To: Marcella Avila  Sent: 2/19/2024 9:56 AM CST  Subject: ACTH    Isn't my result below the normal range @ 3.9 ( normal 7.2-63.3)?

## 2024-02-20 LAB — ALDOSTERONE: 4.4 NG/DL

## 2024-02-24 LAB — RENIN ACTIVITY: 3.99 NG/ML/HR

## 2024-02-27 LAB
METANEPHRINE: 117.7 PG/ML
NORMETANEPHRINE: 422 PG/ML

## 2024-02-28 ENCOUNTER — TELEPHONE (OUTPATIENT)
Dept: ENDOCRINOLOGY CLINIC | Facility: CLINIC | Age: 59
End: 2024-02-28

## 2024-02-28 DIAGNOSIS — D35.00 ADRENAL ADENOMA, UNSPECIFIED LATERALITY: Primary | ICD-10-CM

## 2024-02-28 NOTE — TELEPHONE ENCOUNTER
Please call patient 0- her adrenaline hormone was elevated.  We will need to do further imaging of her adrenal glands.  I would recommend MRI to further evaluate. Order placed.  We can decide further treatment after MRI.

## 2024-02-29 NOTE — TELEPHONE ENCOUNTER
Called and spoke to patient. She wasn't sure why Dr. Avila wanted another MRI of the adrenals, she had one in 2020. Explained to patient doctor needs an updated MRI and asked patient to obtain the CD FROM 2020.  Provided patient with the central scheduling phone number.  Patient wants Dr. Avila to call Dr. Wade Mehta, he is an Allergist she saw in Fall of 2023. Per patient Dr. Avlia was not aware she saw this doctor. Advised patient to obtain the last OV notes and bring them with her for her next appointment.  Patient understanding. Will schedule MRI.

## 2024-02-29 NOTE — TELEPHONE ENCOUNTER
Called patient back regarding MRI date. She wanted to know if she could have the MRI done anyplace else. Informed patient she can have it done elsewhere, she would just need to informed us of the fax number. Patient understanding.

## 2024-02-29 NOTE — TELEPHONE ENCOUNTER
Pt states that the soonest she could get an appt for MRI was 4/18/24 and she thinks this is too far out.  Please call.

## 2024-02-29 NOTE — TELEPHONE ENCOUNTER
Patient is returning the nurses call. Patient is requesting to get a copy of her lab results mailed out to her home.

## 2024-03-03 ENCOUNTER — HOSPITAL ENCOUNTER (OUTPATIENT)
Dept: MRI IMAGING | Age: 59
End: 2024-03-03
Attending: INTERNAL MEDICINE
Payer: MEDICARE

## 2024-03-03 ENCOUNTER — HOSPITAL ENCOUNTER (OUTPATIENT)
Dept: MRI IMAGING | Age: 59
Discharge: HOME | End: 2024-03-03
Attending: INTERNAL MEDICINE
Payer: MEDICARE

## 2024-03-03 DIAGNOSIS — D35.00 ADRENAL ADENOMA, UNSPECIFIED LATERALITY: ICD-10-CM

## 2024-03-03 PROCEDURE — 74183 MRI ABD W/O CNTR FLWD CNTR: CPT | Performed by: INTERNAL MEDICINE

## 2024-03-03 PROCEDURE — A9575 INJ GADOTERATE MEGLUMI 0.1ML: HCPCS | Performed by: INTERNAL MEDICINE

## 2024-03-03 RX ORDER — GADOTERATE MEGLUMINE 376.9 MG/ML
15 INJECTION INTRAVENOUS
Status: COMPLETED | OUTPATIENT
Start: 2024-03-03 | End: 2024-03-03

## 2024-03-03 RX ADMIN — GADOTERATE MEGLUMINE 13 ML: 376.9 INJECTION INTRAVENOUS at 09:43:00

## 2024-03-04 ENCOUNTER — TELEPHONE (OUTPATIENT)
Dept: ENDOCRINOLOGY CLINIC | Facility: CLINIC | Age: 59
End: 2024-03-04

## 2024-03-04 DIAGNOSIS — D35.01 PHEOCHROMOCYTOMA OF RIGHT ADRENAL GLAND: Primary | ICD-10-CM

## 2024-03-04 NOTE — TELEPHONE ENCOUNTER
Called patient to discuss the results.  Elevated metanephrine with adrenal adenoma on imaging and lesion on kidney.  Check 24 hour urine. Refer to Dr. Nagy.     Metanephrines are significantly elevated.  Although adrenal adenoma does appear benign.  Suspect she might need MIBG scan.

## 2024-03-05 ENCOUNTER — TELEPHONE (OUTPATIENT)
Dept: ENDOCRINOLOGY CLINIC | Facility: CLINIC | Age: 59
End: 2024-03-05

## 2024-03-05 NOTE — TELEPHONE ENCOUNTER
Patient wants Dr Avila to know that she is not able to get the 24 hour urine test done but will be seeing the surgeon Dr Nagy on Monday 3/11/2024. The patient wants to know if she is able to hold off on getting the 24 hour urine test done.

## 2024-03-07 ENCOUNTER — LAB ENCOUNTER (OUTPATIENT)
Dept: LAB | Age: 59
End: 2024-03-07
Attending: INTERNAL MEDICINE
Payer: MEDICARE

## 2024-03-07 NOTE — TELEPHONE ENCOUNTER
Dr. Avila, Patient dropped her 24 urine off at [place]. She has appointment with Dr. Nagy Monday 3/11/24.

## 2024-03-12 LAB
CREAT 24H UR: 1056 MG/24 HR
CREAT UR: 45.9 MG/DL
DOPAMINE 24H UR: 205 UG/24 HR
DOPAMINE UR: 89 UG/L
EPINEPH 24H UR: 7 UG/24 HR
EPINEPH UR: 3 UG/L
NOREPINEPH 24H UR: 71 UG/24 HR
NOREPINEPH UR: 31 UG/L

## 2024-03-14 LAB
24 HR CREATININE, URINE: 1127 MG/24 HR
CREATININE, URINE: 49 MG/DL
TOTAL METANEPHRINES, URINE: 557 UG/24 H
TOTAL METANEPHRINES/CRT RATIO: 494 UG/G

## 2024-03-21 ENCOUNTER — TELEPHONE (OUTPATIENT)
Dept: ENDOCRINOLOGY CLINIC | Facility: CLINIC | Age: 59
End: 2024-03-21

## 2024-03-21 NOTE — TELEPHONE ENCOUNTER
Ok, noted.  I would like her to also see PCP in regards to kidney area and make sure there is not another cause for symptoms.     In the meantime we could try a small change in her thyroid meds.  She could take Synthroid 125mcg 4 days per week and 112mcg 3 days per week to see if that helps with symptoms. Thanks.

## 2024-03-21 NOTE — TELEPHONE ENCOUNTER
FYI  Patient agrees with the recommendations as stated in the previous message. Patient was thankful and appreciated all the care provided.

## 2024-03-21 NOTE — TELEPHONE ENCOUNTER
Please call patient - Discussed her imaging with Dr. Nagy.  I was initially concerned about the abnormal adrenaline hormone in regards to her adrenal gland.  However the urine testing was normal and after discussion with Dr. Nagy we agreed this was likely benign spot on adrenal.      We will monitor this area but no need for further evaluation at this time.  I would recommend follow up with urology to evaluate the area on your kidney.  I do think this is area is also stable but best to discuss with specialist.  Do you want to contact your PCP to get urology referral? Thanks.

## 2024-03-21 NOTE — TELEPHONE ENCOUNTER
Patient states that she has been having similar symptoms on and off since 2017.  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     Patient reports the following symptoms:  Always tired, all over body pain and extreme headaches primarily on the right side of head 2 to 3 times a week .    Patient expressed frustration and requests further testing.   Patient states that she can't function due to going to bed tired and waking up more tired.     This RN instructed patient to communicate with her PCP for further assessment and guidance.

## 2024-04-01 ENCOUNTER — TELEPHONE (OUTPATIENT)
Dept: ENDOCRINOLOGY CLINIC | Facility: CLINIC | Age: 59
End: 2024-04-01

## 2024-04-01 ENCOUNTER — TELEMEDICINE (OUTPATIENT)
Dept: ENDOCRINOLOGY CLINIC | Facility: CLINIC | Age: 59
End: 2024-04-01

## 2024-04-01 DIAGNOSIS — E03.8 HYPOTHYROIDISM DUE TO HASHIMOTO'S THYROIDITIS: Primary | ICD-10-CM

## 2024-04-01 DIAGNOSIS — E55.9 VITAMIN D DEFICIENCY: ICD-10-CM

## 2024-04-01 DIAGNOSIS — E06.3 HYPOTHYROIDISM DUE TO HASHIMOTO'S THYROIDITIS: Primary | ICD-10-CM

## 2024-04-01 DIAGNOSIS — E89.0 POSTOPERATIVE HYPOTHYROIDISM: ICD-10-CM

## 2024-04-01 DIAGNOSIS — R53.83 FATIGUE, UNSPECIFIED TYPE: ICD-10-CM

## 2024-04-01 RX ORDER — LIOTHYRONINE SODIUM 5 UG/1
2.5 TABLET ORAL DAILY
Qty: 45 TABLET | Refills: 0 | OUTPATIENT
Start: 2024-04-01

## 2024-04-01 RX ORDER — LEVOTHYROXINE SODIUM 112 MCG
TABLET ORAL
Qty: 24 TABLET | Refills: 1 | Status: SHIPPED | OUTPATIENT
Start: 2024-04-01

## 2024-04-01 RX ORDER — LEVOTHYROXINE SODIUM 125 MCG
TABLET ORAL
Qty: 60 TABLET | Refills: 1 | Status: SHIPPED | OUTPATIENT
Start: 2024-04-01

## 2024-04-01 RX ORDER — LIOTHYRONINE SODIUM 5 UG/1
2.5 TABLET ORAL DAILY
Qty: 14 TABLET | Refills: 0 | Status: SHIPPED | OUTPATIENT
Start: 2024-04-01

## 2024-04-01 NOTE — TELEPHONE ENCOUNTER
Patient states patient was not following instructions given by Jessica - has been following verbal instructions that we given verbally prior to current changes.  Please call.  Thank you.

## 2024-04-01 NOTE — TELEPHONE ENCOUNTER
Patient states she is not feeling well and doesn't think Synthroid is helping.  Please call.  Thank you.

## 2024-04-01 NOTE — PROGRESS NOTES
Name: Ashley Naranjo  Date: 4/1/2024    Referring Physician: No ref. provider found    No chief complaint on file.      HISTORY OF PRESENT ILLNESS   Ashley Naranjo is a 59 year old female who presents for   No chief complaint on file.    #1 Postoperative Hypothyroidism    60 y/o F presents for follow up evaluation of postoperative hypothyroidism.  She underwent total thyroidectomy in 2012 due to Graves Disease.  Prior to surgery she was started on Methimazole but developed significant ocular symptoms therefore referred for surgery.  Final Pathology was benign.      She is followed at [facility] for thyroid eye disease.     She is maintained on Synthroid 125mcg 4 times per week and 112mcg 3 days per week.   She is taking medication in Am and waiting 30-60 min before eating.   She notes cold intolerance and fatigue. +constipation.       Since last visit she underwent CT scan abdomen per cardiologist which demonstrated bilateral adrenal adenoma.  Her allergist checked chromogranin A which was also elevated per patient.     #2 Impaired Fasting Glucose    58 y/o F presents for follow up evaluation of impaired fasting glucose.     HgA1c 5.6% 11/2022     Strong family h/o DM     REVIEW OF SYSTEMS  Eyes: no change in vision  Neurologic: no headache, generalized or focal weakness or numbness.  Head: normal  ENT: normal  Lungs: no shortness of breath, wheezing or TERRELL  Cardiovascular:  no chest pain or palpitations  Gastrointestinal:  no abdominal pain, bowel movement problems  Musculoskeletal: no muscle pain or arthralgia  /Gyne: no frequency or discomfort while urinating  Psychiatric:  no acute distress, anxiety  or depression  Skin: normal moisturized skin    Medications:     Current Outpatient Medications:     SYNTHROID 112 MCG Oral Tab, TAKE ONE TABLET BY MOUTH 4 DAYS PER WEEK AS DIRECTED, Disp: 30 tablet, Rfl: 0    SYNTHROID 125 MCG Oral Tab, 125 mcg dose for 3 days per week (Monday, Thursday, Sunday),  Disp: 30 tablet, Rfl: 0    PROAIR  (90 Base) MCG/ACT Inhalation Aero Soln, INHALE 2 PUFFS PO Q 4 TO 6 H, Disp: , Rfl:     amphetamine-dextroamphetamine 20 MG Oral Tab, Take 1 tablet by mouth 2 (two) times daily., Disp: , Rfl:     aspirin 81 MG Oral Chew Tab, , Disp: , Rfl:     Butalbital-APAP-Caff-Cod -66-30 MG Oral Cap, Take 1 capsule by mouth., Disp: , Rfl:     clonazePAM 0.5 MG Oral Tab, Take by mouth 2 (two) times daily., Disp: , Rfl:     cyclobenzaprine 10 MG Oral Tab, Take 1 tablet (10 mg total) by mouth 3 (three) times daily. take 88 mcg by mouth., Disp: , Rfl:     Fluticasone Propionate 50 MCG/ACT Nasal Suspension, INSTILL 2 SPRAYS IEN QD, Disp: , Rfl:     hydrochlorothiazide 25 MG Oral Tab, Take 1 tablet (25 mg total) by mouth every morning., Disp: , Rfl:     LIDODERM 5 % External Patch, APPLY 1 PATCH TO LUMBAR AREA Q 24 HOURS, Disp: , Rfl:     lubiprostone 24 MCG Oral Cap, 1 capsule with food, Disp: , Rfl:     Meclizine HCl 25 MG Oral Tab, Take 1 tablet (25 mg total) by mouth 3 (three) times daily. take 88 mcg by mouth., Disp: , Rfl:     Montelukast Sodium 10 MG Oral Tab, Take 1 tablet (10 mg total) by mouth nightly., Disp: , Rfl:     Oseltamivir Phosphate 75 MG Oral Cap, Take 1 capsule (75 mg total) by mouth 2 (two) times daily., Disp: , Rfl:     Propranolol HCl 10 MG Oral Tab, TK 1 T PO BID FOR 2 WKS THEN TK 2 TS PO BID FOR 2 WKS, Disp: , Rfl:     traZODone HCl 100 MG Oral Tab, TK ONE T D HS, Disp: , Rfl:     Vitamin D3 2000 units Oral Cap, Take 1 capsule (2,000 Units total) by mouth daily., Disp: , Rfl:     ALPRAZolam 1 MG Oral Tab, Take 1 tablet (1 mg total) by mouth as needed. 0.5, Disp: , Rfl:     Dicyclomine HCl 20 MG Oral Tab, TK 1 T PO QID BEFORE MEALS AND AT BEDTIME., Disp: , Rfl: 3    Ondansetron HCl (ZOFRAN) 4 mg tablet, TK 1 T PO Q 6 HOURS PRN, Disp: , Rfl: 5    RECTIV 0.4 % Rectal Ointment, INSERT 1 MG RECTALLY BID UTD, Disp: , Rfl: 5    carvedilol 12.5 MG Oral Tab, Take 1 tablet  (12.5 mg total) by mouth 2 (two) times daily with meals., Disp: , Rfl:     lisinopril 10 MG Oral Tab, Take 1 tablet (10 mg total) by mouth daily., Disp: , Rfl:     Dexlansoprazole 60 MG Oral Capsule Delayed Release, Take 60 mg by mouth daily., Disp: , Rfl:     Sertraline HCl 100 MG Oral Tab, Take 0.5 tablets (50 mg total) by mouth daily., Disp: , Rfl:      Allergies:   Allergies   Allergen Reactions    Erythromycin DIARRHEA    Latex RASH    Penicillins RASH       Social History:   Social History     Socioeconomic History    Marital status:    Tobacco Use    Smoking status: Every Day     Packs/day: .5     Types: Cigarettes    Smokeless tobacco: Never   Substance and Sexual Activity    Alcohol use: Yes     Comment: occasional    Drug use: No   Other Topics Concern    Caffeine Concern No    Exercise No       Medical History:   Past Medical History:   Diagnosis Date    Anxiety     Aphthous ulcer     Breast cancer (HCC)     CAD (coronary artery disease)     Degenerative disc disease, lumbar     Depression     Essential hypertension     GI bleed     Graves disease     Hyperlipidemia     Hypothyroid     Prediabetes     Trigeminal neuralgia        Surgical history:   Past Surgical History:   Procedure Laterality Date    ANGIOPLASTY (CORONARY)      APPENDECTOMY      HERNIA SURGERY      OTHER SURGICAL HISTORY      thyroidectomy    OTHER SURGICAL HISTORY      left lumpectomy     PHYSICAL EXAM  General Appearance:  alert, well developed, in no acute distress  Eyes:  normal conjunctivae, sclera., normal sclera and normal pupils  Throat/Neck: normal sound to voice.   Back: no kyphosis  Respiratory:  non-labored. no increased work of breathing.    Lymph Nodes:  No abnormal nodes noted  Skin:  normal moisture and skin texture  Hematologic:  no excessive bruising  Psychiatric:  oriented to time, self, and place      ASSESSMENT/PLAN:    1. Postoperative Hypothyroidism  - Discussed diagnosis   - Discussed nonspecific symptoms  of thyroid disease  - Discussed possible T3 and T4 supplementation give persistent symptoms  - Increase Synthroid 125mcg 5 times per week  - Change SYnthroid to 112mcg 2 times per week   - Ok to take Liothyronine 2.5mcg PO daily for 2 weeks   - Recheck TSH, FT4 in 2 months   - Discussed she is not a candidate for HRT given breast cancer history   - Further management based on above results    2. Impaired Fasting Glucose  - HgA1c 5.6% -->stable  - Discussed low CHO diet    3. Adrenal Adenoma  - New diagnosis   - PCP referred to oncology but she was scared to go to visit  - She did see Dr. Scott and adrenal adenoma benign     RTC 6 months     4/1/2024  Marcella Avila MD

## 2024-04-01 NOTE — TELEPHONE ENCOUNTER
See TE from 3/21/24. Patient was advised to change her thyroid medication dose as follows due to continued symptoms:    \"In the meantime we could try a small change in her thyroid meds. She could take Synthroid 125mcg 4 days per week and 112mcg 3 days per week to see if that helps with symptoms. Thanks.\"    Reviewed all recent chart notes and changes. Patient has been undergoing work up for abnormal adrenal/kidney MRI and Dr. Avila has been working with in detail..     Dr. Avila, She continues to have symptoms of fatigue, body aches and pains, severe constipation. She has not seen PCP as most recently suggested. States her PCP is new and gets overwhelmed with her history. Previous PCP of 30 years she no longer sees. Taking synthroid as per above for the past several weeks but does not feel it is helping. Expressed frustration with continued symptoms, tests, and specialist referrals. Not sure what to do next. Recently had a knee injury so difficult to move around.     I see you have a 3pm slot on hold. Would you like me to offer a video visit?

## 2024-04-02 ENCOUNTER — LAB ENCOUNTER (OUTPATIENT)
Dept: LAB | Age: 59
End: 2024-04-02
Attending: INTERNAL MEDICINE
Payer: MEDICARE

## 2024-04-02 DIAGNOSIS — L65.9 HAIR LOSS: ICD-10-CM

## 2024-04-02 DIAGNOSIS — E03.8 HYPOTHYROIDISM DUE TO HASHIMOTO'S THYROIDITIS: ICD-10-CM

## 2024-04-02 DIAGNOSIS — E55.9 VITAMIN D DEFICIENCY: ICD-10-CM

## 2024-04-02 DIAGNOSIS — E06.3 HYPOTHYROIDISM DUE TO HASHIMOTO'S THYROIDITIS: ICD-10-CM

## 2024-04-02 DIAGNOSIS — Z00.00 HEALTHCARE MAINTENANCE: ICD-10-CM

## 2024-04-02 DIAGNOSIS — R53.83 FATIGUE, UNSPECIFIED TYPE: ICD-10-CM

## 2024-04-02 DIAGNOSIS — E89.0 POSTOPERATIVE HYPOTHYROIDISM: ICD-10-CM

## 2024-04-02 LAB
ALBUMIN SERPL-MCNC: 4.5 G/DL (ref 3.2–4.8)
ALBUMIN/GLOB SERPL: 1.7 {RATIO} (ref 1–2)
ALP LIVER SERPL-CCNC: 80 U/L
ALT SERPL-CCNC: 15 U/L
ANION GAP SERPL CALC-SCNC: 5 MMOL/L (ref 0–18)
AST SERPL-CCNC: 16 U/L (ref ?–34)
BILIRUB SERPL-MCNC: 0.3 MG/DL (ref 0.2–1.1)
BUN BLD-MCNC: 11 MG/DL (ref 9–23)
BUN/CREAT SERPL: 14.5 (ref 10–20)
CALCIUM BLD-MCNC: 9.7 MG/DL (ref 8.7–10.4)
CHLORIDE SERPL-SCNC: 103 MMOL/L (ref 98–112)
CO2 SERPL-SCNC: 26 MMOL/L (ref 21–32)
CREAT BLD-MCNC: 0.76 MG/DL
CRP SERPL-MCNC: <0.4 MG/DL (ref ?–1)
EGFRCR SERPLBLD CKD-EPI 2021: 89 ML/MIN/1.73M2 (ref 60–?)
FASTING STATUS PATIENT QL REPORTED: NO
GLOBULIN PLAS-MCNC: 2.6 G/DL (ref 2.8–4.4)
GLUCOSE BLD-MCNC: 101 MG/DL (ref 70–99)
IRON SATN MFR SERPL: 21 %
IRON SERPL-MCNC: 83 UG/DL
OSMOLALITY SERPL CALC.SUM OF ELEC: 278 MOSM/KG (ref 275–295)
POTASSIUM SERPL-SCNC: 4.4 MMOL/L (ref 3.5–5.1)
PROT SERPL-MCNC: 7.1 G/DL (ref 5.7–8.2)
RHEUMATOID FACT SERPL-ACNC: <10 IU/ML (ref ?–14)
SODIUM SERPL-SCNC: 134 MMOL/L (ref 136–145)
T3FREE SERPL-MCNC: 2.6 PG/ML (ref 2.4–4.2)
T4 FREE SERPL-MCNC: 1.6 NG/DL (ref 0.8–1.7)
TIBC SERPL-MCNC: 399 UG/DL (ref 250–425)
TRANSFERRIN SERPL-MCNC: 268 MG/DL (ref 250–380)
TSI SER-ACNC: 1 MIU/ML (ref 0.55–4.78)
VIT B12 SERPL-MCNC: 1236 PG/ML (ref 211–911)
VIT D+METAB SERPL-MCNC: 42.4 NG/ML (ref 30–100)

## 2024-04-02 PROCEDURE — 82306 VITAMIN D 25 HYDROXY: CPT

## 2024-04-02 PROCEDURE — 86140 C-REACTIVE PROTEIN: CPT

## 2024-04-02 PROCEDURE — 86038 ANTINUCLEAR ANTIBODIES: CPT

## 2024-04-02 PROCEDURE — 84439 ASSAY OF FREE THYROXINE: CPT

## 2024-04-02 PROCEDURE — 86225 DNA ANTIBODY NATIVE: CPT

## 2024-04-02 PROCEDURE — 83540 ASSAY OF IRON: CPT

## 2024-04-02 PROCEDURE — 84466 ASSAY OF TRANSFERRIN: CPT

## 2024-04-02 PROCEDURE — 84443 ASSAY THYROID STIM HORMONE: CPT

## 2024-04-02 PROCEDURE — 86431 RHEUMATOID FACTOR QUANT: CPT

## 2024-04-02 PROCEDURE — 84481 FREE ASSAY (FT-3): CPT

## 2024-04-02 PROCEDURE — 82607 VITAMIN B-12: CPT

## 2024-04-02 PROCEDURE — 80053 COMPREHEN METABOLIC PANEL: CPT

## 2024-04-02 PROCEDURE — 36415 COLL VENOUS BLD VENIPUNCTURE: CPT

## 2024-04-03 LAB
DSDNA IGG SERPL IA-ACNC: 0.8 IU/ML
ENA AB SER QL IA: 0.1 UG/L
ENA AB SER QL IA: NEGATIVE

## 2024-04-29 ENCOUNTER — TELEPHONE (OUTPATIENT)
Dept: ENDOCRINOLOGY CLINIC | Facility: CLINIC | Age: 59
End: 2024-04-29

## 2024-04-29 DIAGNOSIS — E03.8 HYPOTHYROIDISM DUE TO HASHIMOTO'S THYROIDITIS: Primary | ICD-10-CM

## 2024-04-29 DIAGNOSIS — E06.3 HYPOTHYROIDISM DUE TO HASHIMOTO'S THYROIDITIS: Primary | ICD-10-CM

## 2024-04-29 NOTE — TELEPHONE ENCOUNTER
Patient states that Liothyronine is not helping with energy or her weight gain.  She is requesting lab orders for a full work up on her thyroid.  Please call

## 2024-04-30 NOTE — TELEPHONE ENCOUNTER
Current medication regimen (LOV 4/1/24):  -  Synthroid 125mcg 5 times per week  - Synthroid 112mcg 2 times per week   - Ok to take Liothyronine 2.5mcg PO daily for 2 weeks

## 2024-04-30 NOTE — TELEPHONE ENCOUNTER
Dr Avila,    Please see message below. Labs done on 4/2/24.     Component      Latest Ref Rng 4/2/2024   TSH      0.550 - 4.780 mIU/mL 1.000        Component      Latest Ref Rng 4/2/2024   T3 FREE      2.40 - 4.20 pg/mL 2.60      Component      Latest Ref Rng 4/2/2024   T4,Free (Direct)      0.8 - 1.7 ng/dL 1.6      Please advise. Thanks

## 2024-05-01 NOTE — TELEPHONE ENCOUNTER
She just had labs a few weeks ago I'm not sure more tests will be helpful at this time.  Ok to stop the liothyronine.  Is she taking Vitamin D?  Or a MVI?

## 2024-05-02 NOTE — TELEPHONE ENCOUNTER
Called pt and advised that repeating the thyroid labs at this time will not be helpful. Pt states that she has been struggling with fatigue, weight gain, and constipation for a few months and is getting frustrated that she constantly goes from hypo to hyper. Pt would like further work up to try to get to the root of the issue.   Pt states that she is currently taking Vitamin D 5000 international units. Pt sometimes takes a MVI, half women's centrum.   Pt states that she is agreeable to keep taking liothyronine if it will help with her symptoms especially the weight gain. Pt is asking if Liothyronine dose should be increased.

## 2024-05-03 RX ORDER — LEVOTHYROXINE SODIUM 112 MCG
112 TABLET ORAL
Qty: 90 TABLET | Refills: 1 | Status: SHIPPED | OUTPATIENT
Start: 2024-05-03

## 2024-05-03 RX ORDER — LIOTHYRONINE SODIUM 5 MCG
5 TABLET ORAL DAILY
Qty: 90 TABLET | Refills: 1 | Status: SHIPPED | OUTPATIENT
Start: 2024-05-03

## 2024-05-03 NOTE — TELEPHONE ENCOUNTER
Ok, noted.  If we continue the Liothyronine then lets increase to a full tablet once daily and decrease Synthroid to 112mcg PO daily #90, refill 1 (brand name).  We can repeat TSH, FT4, FT3 in 6 weeks to evaluate.  Thanks.

## 2024-05-03 NOTE — TELEPHONE ENCOUNTER
I called and spoke with the patient. She is OK with plan below except requesting one minor adjustment. Ok to change from generic liothyronine to brand cytomel? She has tried cytomel in the past and states she felt better on the brand only. If so, both prescriptions pending.     Patient has lab work done at the below outside location:  [facility] [place] [address], [place] [address]  Phone: [phone]  Fax: [phone]    Lab orders placed as written and faxed to preferred lab per patient request.

## 2024-05-10 RX ORDER — LIOTHYRONINE SODIUM 5 UG/1
5 TABLET ORAL DAILY
Qty: 90 TABLET | Refills: 1 | Status: SHIPPED | OUTPATIENT
Start: 2024-05-10

## 2024-05-10 NOTE — TELEPHONE ENCOUNTER
Patient is requesting for the generic brand Liothyronine is out of it and synthroid 125 mcg please follow up

## 2024-05-10 NOTE — TELEPHONE ENCOUNTER
Spoke to patient - she has new insurance and is requesting RX for liothyronine 5mcg daily - RX sent   Patient requesting RX for 125mcg Synthroid in case liothryronine cannot be filled - RN explained liothyronine should not have issues being filled - RN will check with pharmacy before sending 125mcg Synthroid - per TE dtd 4/29/24 Dr. Avila recommending 112mcg Synthroid daily with 5mcg liothyronine daily     Spoke to pharmacist: liothyronine goes through insurance with co-pay $1.55 - will be ready for pickup later today  Spoke to patient to update - patient stated understanding and stated no need to send RX for 125mcg Synthroid - patient will take 112mcg Synthroid and liothyronine 5mcg daily

## 2024-06-11 ENCOUNTER — LAB ENCOUNTER (OUTPATIENT)
Dept: LAB | Age: 59
End: 2024-06-11
Attending: INTERNAL MEDICINE
Payer: MEDICARE

## 2024-06-11 DIAGNOSIS — R73.01 IMPAIRED FASTING GLUCOSE: ICD-10-CM

## 2024-06-11 DIAGNOSIS — E89.0 POSTOPERATIVE HYPOTHYROIDISM: ICD-10-CM

## 2024-06-11 DIAGNOSIS — E03.8 HYPOTHYROIDISM DUE TO HASHIMOTO'S THYROIDITIS: ICD-10-CM

## 2024-06-11 DIAGNOSIS — E06.3 HYPOTHYROIDISM DUE TO HASHIMOTO'S THYROIDITIS: ICD-10-CM

## 2024-06-11 DIAGNOSIS — Z00.00 HEALTHCARE MAINTENANCE: ICD-10-CM

## 2024-06-11 LAB
EST. AVERAGE GLUCOSE BLD GHB EST-MCNC: 126 MG/DL (ref 68–126)
HBA1C MFR BLD: 6 % (ref ?–5.7)
T3FREE SERPL-MCNC: 3.33 PG/ML (ref 2.4–4.2)
T4 FREE SERPL-MCNC: 1.7 NG/DL (ref 0.8–1.7)
TSI SER-ACNC: 0.48 MIU/ML (ref 0.55–4.78)

## 2024-06-11 PROCEDURE — 84439 ASSAY OF FREE THYROXINE: CPT

## 2024-06-11 PROCEDURE — 80048 BASIC METABOLIC PNL TOTAL CA: CPT

## 2024-06-11 PROCEDURE — 36415 COLL VENOUS BLD VENIPUNCTURE: CPT

## 2024-06-11 PROCEDURE — 83036 HEMOGLOBIN GLYCOSYLATED A1C: CPT

## 2024-06-11 PROCEDURE — 84481 FREE ASSAY (FT-3): CPT

## 2024-06-11 PROCEDURE — 84443 ASSAY THYROID STIM HORMONE: CPT

## 2024-06-12 LAB
ANION GAP SERPL CALC-SCNC: 6 MMOL/L (ref 0–18)
BUN BLD-MCNC: 14 MG/DL (ref 9–23)
BUN/CREAT SERPL: 18.9 (ref 10–20)
CALCIUM BLD-MCNC: 9.4 MG/DL (ref 8.7–10.4)
CHLORIDE SERPL-SCNC: 103 MMOL/L (ref 98–112)
CO2 SERPL-SCNC: 23 MMOL/L (ref 21–32)
CREAT BLD-MCNC: 0.74 MG/DL
EGFRCR SERPLBLD CKD-EPI 2021: 92 ML/MIN/1.73M2 (ref 60–?)
FASTING STATUS PATIENT QL REPORTED: YES
GLUCOSE BLD-MCNC: 82 MG/DL (ref 70–99)
OSMOLALITY SERPL CALC.SUM OF ELEC: 274 MOSM/KG (ref 275–295)
POTASSIUM SERPL-SCNC: 4.2 MMOL/L (ref 3.5–5.1)
SODIUM SERPL-SCNC: 132 MMOL/L (ref 136–145)

## 2024-06-18 ENCOUNTER — TELEPHONE (OUTPATIENT)
Dept: ENDOCRINOLOGY CLINIC | Facility: CLINIC | Age: 59
End: 2024-06-18

## 2024-06-18 DIAGNOSIS — E03.8 HYPOTHYROIDISM DUE TO HASHIMOTO'S THYROIDITIS: ICD-10-CM

## 2024-06-18 DIAGNOSIS — E06.3 HYPOTHYROIDISM DUE TO HASHIMOTO'S THYROIDITIS: ICD-10-CM

## 2024-06-18 DIAGNOSIS — E89.0 POSTOPERATIVE HYPOTHYROIDISM: ICD-10-CM

## 2024-06-18 RX ORDER — LIOTHYRONINE SODIUM 5 UG/1
5 TABLET ORAL DAILY
Qty: 90 TABLET | Refills: 1 | Status: CANCELLED | OUTPATIENT
Start: 2024-06-18

## 2024-06-18 RX ORDER — LEVOTHYROXINE SODIUM 112 MCG
TABLET ORAL
Qty: 24 TABLET | Refills: 1 | Status: CANCELLED | OUTPATIENT
Start: 2024-06-18

## 2024-06-18 NOTE — TELEPHONE ENCOUNTER
If patient has anxiety and based on low TSH I typically recommend cutting back on thyroid medication.  She can cut the liothyronine from 5 mcg to half tablet which is 2.5 MCG daily.  However cutting back on thyroid medication can contribute to some weight gain.  Concern dose if she is agreeable.  If she prefers to wait for Dr. Avila;  can send current prescription for 1 month supply and Dr. Avila and recommend further upon return.  If she has any worsening of anxiety she should please go to the immediate care.  Thanks.

## 2024-06-18 NOTE — TELEPHONE ENCOUNTER
,    Pt is still concerned with the TSH level and hyper symptoms.     Called pt and provided the below message. Pt declined to decrease liothyronine dose.   Pt stated that she is concerned that her body is not doing well with TSH level being 0.484. Pt states that has feelings of hyper, anxiety and fast heartbeat. Advised pt to report to immediate care if symptoms persist. Pt verbalized understanding.   Pt would like to hear from Dr. Avila once she is back in office.   Pt requesting that 90 day supply of both medications be sent to pharmacy.     Called pharmacy and was notified that pt last picked up 5/07 for 90 supply but pt has 1 refill for both liothyronine and synthroid.   Called pt and notified. Advised pt to contact pharmacy with any questions. Pt verbalized understanding.

## 2024-06-18 NOTE — TELEPHONE ENCOUNTER
Patient needs refill is out SYNTHROID 112 MCG Oral Tab and liothyronine 5 MCG Oral Tab please follow up

## 2024-06-18 NOTE — TELEPHONE ENCOUNTER
Dr Hopkins,     Patient concerned about TSH level and sodium level. Reports symptoms of anxiety and weight gain.      Good news, your thyroid levels are normal and at goal.  HgA1c is slightly higher so please continue low carbohydrate diet.  Please continue current dose of medication and contact the clinic with any questions or concerns.  Dr. Avila   Written by Marcella Avila MD on 6/13/2024  8:40 AM CDT  Seen by patient Ashley Naranjo on 6/13/2024  8:48 AM      RN communicated message from Dr Avila regarding labs and explained TSH lab in only a little under range and Dr recommending the same dose.     Patient insisting that cannot wait until Monday for Dr Avila and that she is completely out of thyroid medication.       Component      Latest Ref Rng 6/11/2024   Sodium      136 - 145 mmol/L 132 (L)           TSH   Date Value Ref Range Status   06/11/2024 0.484 (L) 0.550 - 4.780 mIU/mL Final   02/06/2018 0.271 (L) 0.350 - 5.500 uIU/mL Final

## 2024-06-23 NOTE — TELEPHONE ENCOUNTER
Ok, noted.  Her thyroid level is stable.  However if she is concerned I agree with Dr. Hopkins that best option is to decrease the liothyronine dose.  However if she does not want to proceed with that option then decrease Levothyroxine to 100mcg PO daily #90, refill 1 and recheck TSH, FT4, FT3 in 2months. Thanks.

## 2024-06-26 RX ORDER — LIOTHYRONINE SODIUM 5 UG/1
2.5 TABLET ORAL DAILY
Qty: 45 TABLET | Refills: 0 | Status: SHIPPED | OUTPATIENT
Start: 2024-06-26 | End: 2024-09-24

## 2024-06-26 NOTE — TELEPHONE ENCOUNTER
Dr Avila,    Patient agrees with cutting back liothyronine dose.    Rx pended for liothyronine 2.5 mg daily and labs in two months.    Thank you

## 2024-07-22 DIAGNOSIS — E06.3 HYPOTHYROIDISM DUE TO HASHIMOTO'S THYROIDITIS: ICD-10-CM

## 2024-07-22 NOTE — TELEPHONE ENCOUNTER
Endocrine refill protocol for medications for hypothyroidism and hyperthyroidism    Protocol Criteria: fail - TSH L  Appointment with Endocrinology completed in the last 12 months or scheduled in the next 6 months     Verify appointment has been completed or scheduled in the appropriate timeline. If so can send a 90 day supply with 1 refill per provider protocol.    Normal TSH result in the past 12 months   Review recent telephone encounters and mychart communications with patient to ensure a dose change has not occurred since last office visit that was not updated in the medication history list   Last completed office visit:4/1/2024 Marcella Avila MD   Next scheduled Follow up: No future appointments.   Last TSH result:   TSH   Date Value Ref Range Status   06/11/2024 0.484 (L) 0.550 - 4.780 mIU/mL Final   02/06/2018 0.271 (L) 0.350 - 5.500 uIU/mL Final

## 2024-07-23 RX ORDER — LIOTHYRONINE SODIUM 5 UG/1
5 TABLET ORAL DAILY
Qty: 90 TABLET | Refills: 1 | Status: SHIPPED | OUTPATIENT
Start: 2024-07-23

## 2024-08-05 RX ORDER — LEVOTHYROXINE SODIUM 112 MCG
112 TABLET ORAL
Qty: 90 TABLET | Refills: 1 | OUTPATIENT
Start: 2024-08-05

## 2024-10-25 ENCOUNTER — TELEPHONE (OUTPATIENT)
Dept: ENDOCRINOLOGY CLINIC | Facility: CLINIC | Age: 59
End: 2024-10-25

## 2024-10-25 DIAGNOSIS — Z00.00 HEALTHCARE MAINTENANCE: Primary | ICD-10-CM

## 2024-10-29 NOTE — TELEPHONE ENCOUNTER
Dr. Avila, see patient request. BMP and A1C were ordered last on 6/11/24. Currently there are thyroid labs ordered.     LOV was 4/1/24, [facility]

## 2024-10-30 ENCOUNTER — TELEPHONE (OUTPATIENT)
Dept: ENDOCRINOLOGY CLINIC | Facility: CLINIC | Age: 59
End: 2024-10-30

## 2024-10-30 NOTE — TELEPHONE ENCOUNTER
Patient scheduled a follow up for February.  She wants to know if she should have labs drawn now because she is due to repeat and wants to know if her A1C needs to be repeated due to previous results.  Lab orders are shown as not released.   Patient is requesting to speak with RN.  Please call

## 2024-11-14 ENCOUNTER — LAB ENCOUNTER (OUTPATIENT)
Dept: LAB | Age: 59
End: 2024-11-14
Attending: INTERNAL MEDICINE
Payer: MEDICARE

## 2024-11-14 DIAGNOSIS — E89.0 POSTOPERATIVE HYPOTHYROIDISM: ICD-10-CM

## 2024-11-14 DIAGNOSIS — Z00.00 HEALTHCARE MAINTENANCE: ICD-10-CM

## 2024-11-14 LAB
ANION GAP SERPL CALC-SCNC: 6 MMOL/L (ref 0–18)
BUN BLD-MCNC: 12 MG/DL (ref 9–23)
BUN/CREAT SERPL: 15 (ref 10–20)
CALCIUM BLD-MCNC: 10.4 MG/DL (ref 8.7–10.4)
CHLORIDE SERPL-SCNC: 102 MMOL/L (ref 98–112)
CO2 SERPL-SCNC: 27 MMOL/L (ref 21–32)
CREAT BLD-MCNC: 0.8 MG/DL
EGFRCR SERPLBLD CKD-EPI 2021: 84 ML/MIN/1.73M2 (ref 60–?)
EST. AVERAGE GLUCOSE BLD GHB EST-MCNC: 126 MG/DL (ref 68–126)
FASTING STATUS PATIENT QL REPORTED: NO
GLUCOSE BLD-MCNC: 92 MG/DL (ref 70–99)
HBA1C MFR BLD: 6 % (ref ?–5.7)
OSMOLALITY SERPL CALC.SUM OF ELEC: 279 MOSM/KG (ref 275–295)
POTASSIUM SERPL-SCNC: 3.8 MMOL/L (ref 3.5–5.1)
SODIUM SERPL-SCNC: 135 MMOL/L (ref 136–145)
T3FREE SERPL-MCNC: 2.58 PG/ML (ref 2.4–4.2)
T4 FREE SERPL-MCNC: 1.5 NG/DL (ref 0.8–1.7)
TSI SER-ACNC: 0.5 UIU/ML (ref 0.55–4.78)

## 2024-11-14 PROCEDURE — 84439 ASSAY OF FREE THYROXINE: CPT

## 2024-11-14 PROCEDURE — 83036 HEMOGLOBIN GLYCOSYLATED A1C: CPT

## 2024-11-14 PROCEDURE — 80048 BASIC METABOLIC PNL TOTAL CA: CPT

## 2024-11-14 PROCEDURE — 36415 COLL VENOUS BLD VENIPUNCTURE: CPT

## 2024-11-14 PROCEDURE — 84443 ASSAY THYROID STIM HORMONE: CPT

## 2024-11-14 PROCEDURE — 84481 FREE ASSAY (FT-3): CPT

## 2024-11-27 DIAGNOSIS — E06.3 HYPOTHYROIDISM DUE TO HASHIMOTO'S THYROIDITIS: Primary | ICD-10-CM

## 2024-11-27 RX ORDER — LEVOTHYROXINE SODIUM 112 MCG
112 TABLET ORAL
Qty: 90 TABLET | Refills: 1 | Status: SHIPPED | OUTPATIENT
Start: 2024-11-27

## 2024-11-27 NOTE — TELEPHONE ENCOUNTER
Per labs dtd 11/14/24: Good news, thyroid levels are at goal.  Glucose level is stable.  Normal kidney function.  Dr. Avila      Endocrine refill protocol for medications for hypothyroidism and hyperthyroidism    Protocol Criteria:  FAILED Reason: Abnormal labs    If all below requirements are met, send a 90-day supply with 1 refill per provider protocol.    Verify appointment with Endocrinology completed in the last 12 months or scheduled in the next 6 months.    Normal TSH result in the past 12 months   Review recent telephone encounters and mychart communications with patient to ensure a dose change has not occurred since last office visit that was not updated in the medication history list     Last completed office visit:4/1/2024 Marcella Avila MD   Next scheduled Follow up:   Future Appointments   Date Time Provider Department Center   2/17/2025  1:45 PM Marcella Avila MD [facility]      Last TSH result:   TSH   Date Value Ref Range Status   11/14/2024 0.497 (L) 0.550 - 4.780 uIU/mL Final   02/06/2018 0.271 (L) 0.350 - 5.500 uIU/mL Final

## 2025-01-20 ENCOUNTER — TELEPHONE (OUTPATIENT)
Dept: ENDOCRINOLOGY CLINIC | Facility: CLINIC | Age: 60
End: 2025-01-20

## 2025-01-20 DIAGNOSIS — R73.01 IMPAIRED FASTING GLUCOSE: ICD-10-CM

## 2025-01-20 DIAGNOSIS — E06.3 HYPOTHYROIDISM DUE TO HASHIMOTO'S THYROIDITIS: Primary | ICD-10-CM

## 2025-01-20 NOTE — TELEPHONE ENCOUNTER
Patient calling regards refill request, states had lost medication and is out. Please call.     (Synthroid 112 MCG)   ([facility])

## 2025-01-20 NOTE — TELEPHONE ENCOUNTER
12/04 90 day. There is one refill on file for 9- day supply. Per Birst, pt has new insurance. Next refill is for 2/2.     OOP: 30 day $83.99    Per pt she does not know what happened to Synthroid 112 mcg. Patient states she has been taking left over 100 mcg and 125 mcg, one of each on an alternating schedule. Patient states she does not know how much of each she has left but will call later to confirm. Patient states she is unable to afford OOP cost of Synthroid.     Dr. Avila,     Is pt able to take the alternating doses of 100 mcg and 125 mcg until she is able to  Synthroid 112 mcg 2/2?

## 2025-01-20 NOTE — TELEPHONE ENCOUNTER
Dr. Ambrose,     Please advise for Dr. Avila's pt.     Patient states she has been taking these two doses since end of November since she lost Synthroid 112 mcg. She currently does not need refill on 100 or 125 mcg doses. PATIENT states she has been feeling very weak and fatigued.     Patient confirmed compliance with:   Synthroid 125 mcg Sunday, Tuesday, Thursday and Saturday  Synthroid 100 mcg Monday, Wednesday, Friday     Patient requesting lab orders for thyroid, sodium, A1c. Labs pended for review.

## 2025-01-21 ENCOUNTER — LAB ENCOUNTER (OUTPATIENT)
Dept: LAB | Age: 60
End: 2025-01-21
Attending: INTERNAL MEDICINE
Payer: MEDICARE

## 2025-01-21 DIAGNOSIS — E06.3 HYPOTHYROIDISM DUE TO HASHIMOTO'S THYROIDITIS: ICD-10-CM

## 2025-01-21 DIAGNOSIS — R73.01 IMPAIRED FASTING GLUCOSE: ICD-10-CM

## 2025-01-21 LAB
ANION GAP SERPL CALC-SCNC: 7 MMOL/L (ref 0–18)
BUN BLD-MCNC: 7 MG/DL (ref 9–23)
BUN/CREAT SERPL: 7.9 (ref 10–20)
CALCIUM BLD-MCNC: 9.9 MG/DL (ref 8.7–10.4)
CHLORIDE SERPL-SCNC: 99 MMOL/L (ref 98–112)
CO2 SERPL-SCNC: 29 MMOL/L (ref 21–32)
CREAT BLD-MCNC: 0.89 MG/DL
EGFRCR SERPLBLD CKD-EPI 2021: 74 ML/MIN/1.73M2 (ref 60–?)
EST. AVERAGE GLUCOSE BLD GHB EST-MCNC: 126 MG/DL (ref 68–126)
FASTING STATUS PATIENT QL REPORTED: NO
GLUCOSE BLD-MCNC: 117 MG/DL (ref 70–99)
HBA1C MFR BLD: 6 % (ref ?–5.7)
OSMOLALITY SERPL CALC.SUM OF ELEC: 279 MOSM/KG (ref 275–295)
POTASSIUM SERPL-SCNC: 3.8 MMOL/L (ref 3.5–5.1)
SODIUM SERPL-SCNC: 135 MMOL/L (ref 136–145)
T3FREE SERPL-MCNC: 2.68 PG/ML (ref 2.4–4.2)
T4 FREE SERPL-MCNC: 1.5 NG/DL (ref 0.8–1.7)
TSI SER-ACNC: 0.27 UIU/ML (ref 0.55–4.78)

## 2025-01-21 PROCEDURE — 84481 FREE ASSAY (FT-3): CPT

## 2025-01-21 PROCEDURE — 84439 ASSAY OF FREE THYROXINE: CPT

## 2025-01-21 PROCEDURE — 36415 COLL VENOUS BLD VENIPUNCTURE: CPT

## 2025-01-21 PROCEDURE — 83036 HEMOGLOBIN GLYCOSYLATED A1C: CPT

## 2025-01-21 PROCEDURE — 84443 ASSAY THYROID STIM HORMONE: CPT

## 2025-01-21 PROCEDURE — 80048 BASIC METABOLIC PNL TOTAL CA: CPT

## 2025-01-21 NOTE — TELEPHONE ENCOUNTER
Notified that labs have been ordered and can be completed at earliest convenience. Patient states she has been taking the following medications, not 100 mcg.     Synthroid 125 mcg Sunday, Tuesday, Thursday and Saturday  Synthroid 112 mcg Monday, Wednesday, Friday

## 2025-01-21 NOTE — TELEPHONE ENCOUNTER
Ok to continue alternating 112 and 125 - will await lab results.  She also has upcoming appt in Feb to review plan. Thanks.

## 2025-01-21 NOTE — TELEPHONE ENCOUNTER
Spoke to pt. Provided recommendations written below by MD. Pt verbalized understanding. Denies further questions or concerns.

## 2025-01-29 ENCOUNTER — TELEPHONE (OUTPATIENT)
Dept: ENDOCRINOLOGY CLINIC | Facility: CLINIC | Age: 60
End: 2025-01-29

## 2025-01-29 DIAGNOSIS — E06.3 HYPOTHYROIDISM DUE TO HASHIMOTO'S THYROIDITIS: ICD-10-CM

## 2025-01-29 RX ORDER — LEVOTHYROXINE SODIUM 112 MCG
TABLET ORAL
Qty: 36 TABLET | Refills: 0 | Status: SHIPPED | OUTPATIENT
Start: 2025-01-29

## 2025-01-29 RX ORDER — LEVOTHYROXINE SODIUM 100 MCG
TABLET ORAL
Qty: 48 TABLET | Refills: 0 | Status: SHIPPED | OUTPATIENT
Start: 2025-01-29

## 2025-01-29 NOTE — TELEPHONE ENCOUNTER
Called the patient. Verified name and date of birth. Provided her with Dr. Avila's instructions below. She wrote down the dosing instructions and repeated back to this RN correctly.     Dr. Avila, she is requesting 90 day refills on brand synthroid with the updated dosing instructions. Pending.

## 2025-01-29 NOTE — TELEPHONE ENCOUNTER
Dr Avila,    Per patient \"she doesn't feel in range\". Reports headache, heat intolerance, thirst, easily agitated, sweats, and insomnia. Denies palpitations or tremors. She is short of breath with activity at baseline due to heart condition.    Reports constipation at baseline. Feels slow, her body is sluggish, and she can't concentrate. She also reports weight gain despite following healthy diet.     Reports compliance with:    Synthroid 125 mcg Sunday, Tuesday, Thursday and Saturday  Synthroid 112 mcg Monday, Wednesday, Friday     Asking if she should start 100 mcg dose. States she has it available.    Patient is scheduled to see you in one week but wants to know if she should change dose prior to appointment or if there are additional labs needed.

## 2025-01-29 NOTE — TELEPHONE ENCOUNTER
No need for any labs.  She can change the 125mcg tablets for the 100mcg tablets.  So use a combo of 100mcg and 112mcg tablets in current pattern. Thanks.

## 2025-02-05 ENCOUNTER — TELEPHONE (OUTPATIENT)
Dept: ENDOCRINOLOGY CLINIC | Facility: CLINIC | Age: 60
End: 2025-02-05

## 2025-02-05 ENCOUNTER — OFFICE VISIT (OUTPATIENT)
Dept: ENDOCRINOLOGY CLINIC | Facility: CLINIC | Age: 60
End: 2025-02-05
Payer: MEDICARE

## 2025-02-05 VITALS
SYSTOLIC BLOOD PRESSURE: 136 MMHG | HEART RATE: 79 BPM | BODY MASS INDEX: 22 KG/M2 | DIASTOLIC BLOOD PRESSURE: 79 MMHG | WEIGHT: 140 LBS

## 2025-02-05 DIAGNOSIS — E06.3 HYPOTHYROIDISM DUE TO HASHIMOTO'S THYROIDITIS: ICD-10-CM

## 2025-02-05 PROCEDURE — 99214 OFFICE O/P EST MOD 30 MIN: CPT | Performed by: INTERNAL MEDICINE

## 2025-02-05 RX ORDER — LEVOTHYROXINE SODIUM 112 MCG
112 TABLET ORAL
Qty: 90 TABLET | Refills: 1 | Status: SHIPPED | OUTPATIENT
Start: 2025-02-05

## 2025-02-05 RX ORDER — LEVOTHYROXINE SODIUM 112 MCG
TABLET ORAL
Qty: 90 TABLET | Refills: 1 | Status: SHIPPED | OUTPATIENT
Start: 2025-02-05 | End: 2025-02-05

## 2025-02-05 RX ORDER — LIOTHYRONINE SODIUM 5 UG/1
5 TABLET ORAL DAILY
Qty: 90 TABLET | Refills: 1 | Status: SHIPPED | OUTPATIENT
Start: 2025-02-05

## 2025-02-05 NOTE — TELEPHONE ENCOUNTER
Dr. Avila, updated prescription sent for synthroid 112 mcg to take daily per LOV note from today. Please let us know if you would like alternative instructions. Thank you.     Patient was seen today by Dr. Avila. With the following instructions:    - Change Synthroid to 112mcg PO daily   - Continue Liothyronine 5mcg PO daily for 2 weeks   - Recheck TSH, FT4 in 2 months     Current prescription states:    SYNTHROID 112 MCG Oral Tab 90 tablet 1 2/5/2025 --   Sig:   Take one tablet daily by mouth on Mondays, Wednesdays, and Fridays       Called and spoke with the patient. She states she was previously taking 112 mcg three times per week but was instructed at her OV today to take 1 tablet daily.     Confirmed this plan of care per Dr. Avila's written office note from today.   Updated Rx sent as written.

## 2025-02-05 NOTE — TELEPHONE ENCOUNTER
[facility] pharmacy is requesting for a new prescription for synthroid with the correct directions please follow up patient needs refill right away if possible

## 2025-02-05 NOTE — PROGRESS NOTES
Name: Ashley Naranjo  Date: 2/5/2025    Referring Physician: No ref. provider found    Chief Complaint   Patient presents with    Hypothyroidism       HISTORY OF PRESENT ILLNESS   Ashley Naranjo is a 59 year old female who presents for   Chief Complaint   Patient presents with    Hypothyroidism     #1 Postoperative Hypothyroidism    58 y/o F presents for follow up evaluation of postoperative hypothyroidism.  She underwent total thyroidectomy in 2012 due to Graves Disease.  Prior to surgery she was started on Methimazole but developed significant ocular symptoms therefore referred for surgery.  Final Pathology was benign.      She is followed at [facility] for thyroid eye disease.     She did try Durbin thyroid with a different Endo was not effective.     Since last visit her thyroid dose has been adjusted.  She is now maintained on Synthroid 100mcg 4 days per week and 112mcg 3 days per week.  In addition she is taking liothyronine 5mcg daily.  She continues to have significant nonspecific symptoms but thyroid dose has been changed many times without improvement.     She notes cold intolerance and fatigue. +constipation. +night sweats. +dry skin, +mental fogginess     She is having diffuse back pain every day in there afternoon.     Since last visit she underwent CT scan abdomen per cardiologist which demonstrated bilateral adrenal adenoma.  Her allergist checked chromogranin A which was also elevated per patient. Since that time she did undergo evaluation by Dr. Nagy and likely benign adenoma.     #2 Impaired Fasting Glucose    58 y/o F presents for follow up evaluation of impaired fasting glucose.     HgA1c 5.6% 11/2022     Strong family h/o DM     REVIEW OF SYSTEMS  Eyes: no change in vision  Neurologic: no headache, generalized or focal weakness or numbness.  Head: normal  ENT: normal  Lungs: no shortness of breath, wheezing or TERRELL  Cardiovascular:  no chest pain or palpitations  Gastrointestinal:   no abdominal pain, bowel movement problems  Musculoskeletal: no muscle pain or arthralgia  /Gyne: no frequency or discomfort while urinating  Psychiatric:  no acute distress, anxiety  or depression  Skin: normal moisturized skin    Medications:     Current Outpatient Medications:     SYNTHROID 112 MCG Oral Tab, Take one tablet daily by mouth on Mondays, Wednesdays, and Fridays, Disp: 36 tablet, Rfl: 0    SYNTHROID 100 MCG Oral Tab, Take one tablet daily by mouth on Sundays, Tuesdays, Thursdays, and Saturdays., Disp: 48 tablet, Rfl: 0    LIOTHYRONINE 5 MCG Oral Tab, TAKE 1 TABLET(5 MCG) BY MOUTH DAILY, Disp: 90 tablet, Rfl: 1    PROAIR  (90 Base) MCG/ACT Inhalation Aero Soln, INHALE 2 PUFFS PO Q 4 TO 6 H, Disp: , Rfl:     amphetamine-dextroamphetamine 20 MG Oral Tab, Take 1 tablet by mouth 2 (two) times daily., Disp: , Rfl:     aspirin 81 MG Oral Chew Tab, , Disp: , Rfl:     Butalbital-APAP-Caff-Cod -04-30 MG Oral Cap, Take 1 capsule by mouth., Disp: , Rfl:     clonazePAM 0.5 MG Oral Tab, Take by mouth 2 (two) times daily., Disp: , Rfl:     cyclobenzaprine 10 MG Oral Tab, Take 1 tablet (10 mg total) by mouth 3 (three) times daily. take 88 mcg by mouth., Disp: , Rfl:     Fluticasone Propionate 50 MCG/ACT Nasal Suspension, INSTILL 2 SPRAYS IEN QD, Disp: , Rfl:     hydrochlorothiazide 25 MG Oral Tab, Take 1 tablet (25 mg total) by mouth every morning., Disp: , Rfl:     LIDODERM 5 % External Patch, APPLY 1 PATCH TO LUMBAR AREA Q 24 HOURS, Disp: , Rfl:     lubiprostone 24 MCG Oral Cap, 1 capsule with food, Disp: , Rfl:     Meclizine HCl 25 MG Oral Tab, Take 1 tablet (25 mg total) by mouth 3 (three) times daily. take 88 mcg by mouth., Disp: , Rfl:     Montelukast Sodium 10 MG Oral Tab, Take 1 tablet (10 mg total) by mouth nightly., Disp: , Rfl:     Oseltamivir Phosphate 75 MG Oral Cap, Take 1 capsule (75 mg total) by mouth 2 (two) times daily., Disp: , Rfl:     Propranolol HCl 10 MG Oral Tab, TK 1 T PO BID  FOR 2 WKS THEN TK 2 TS PO BID FOR 2 WKS, Disp: , Rfl:     traZODone HCl 100 MG Oral Tab, TK ONE T D HS, Disp: , Rfl:     Vitamin D3 2000 units Oral Cap, Take 1 capsule (2,000 Units total) by mouth daily., Disp: , Rfl:     ALPRAZolam 1 MG Oral Tab, Take 1 tablet (1 mg total) by mouth as needed. 0.5, Disp: , Rfl:     Dicyclomine HCl 20 MG Oral Tab, TK 1 T PO QID BEFORE MEALS AND AT BEDTIME., Disp: , Rfl: 3    Ondansetron HCl (ZOFRAN) 4 mg tablet, TK 1 T PO Q 6 HOURS PRN, Disp: , Rfl: 5    RECTIV 0.4 % Rectal Ointment, INSERT 1 MG RECTALLY BID UTD, Disp: , Rfl: 5    carvedilol 12.5 MG Oral Tab, Take 1 tablet (12.5 mg total) by mouth 2 (two) times daily with meals., Disp: , Rfl:     lisinopril 10 MG Oral Tab, Take 1 tablet (10 mg total) by mouth daily., Disp: , Rfl:     Dexlansoprazole 60 MG Oral Capsule Delayed Release, Take 60 mg by mouth daily., Disp: , Rfl:     Sertraline HCl 100 MG Oral Tab, Take 0.5 tablets (50 mg total) by mouth daily., Disp: , Rfl:      Allergies:   Allergies   Allergen Reactions    Erythromycin DIARRHEA    Latex RASH    Penicillins RASH       Social History:   Social History     Socioeconomic History    Marital status:    Tobacco Use    Smoking status: Every Day     Current packs/day: 0.50     Types: Cigarettes    Smokeless tobacco: Never   Substance and Sexual Activity    Alcohol use: Yes     Comment: occasional    Drug use: No   Other Topics Concern    Caffeine Concern No    Exercise No       Medical History:   Past Medical History:    Anxiety    Aphthous ulcer    Breast cancer (HCC)    CAD (coronary artery disease)    Degenerative disc disease, lumbar    Depression    Essential hypertension    GI bleed    Graves disease    Hyperlipidemia    Hypothyroid    Prediabetes    Trigeminal neuralgia       Surgical history:   Past Surgical History:   Procedure Laterality Date    Angioplasty (coronary)      Appendectomy      Hernia surgery      Other surgical history      thyroidectomy    Other  surgical history      left lumpectomy     PHYSICAL EXAM  /79   Pulse 79   Wt 140 lb (63.5 kg)   BMI 21.93 kg/m²     General Appearance:  alert, well developed, in no acute distress  Eyes:  normal conjunctivae, sclera., normal sclera and normal pupils  Throat/Neck: normal sound to voice.   Back: no kyphosis  Respiratory:  non-labored. no increased work of breathing.    Lymph Nodes:  No abnormal nodes noted  Skin:  normal moisture and skin texture  Hematologic:  no excessive bruising  Psychiatric:  oriented to time, self, and place      ASSESSMENT/PLAN:    1. Postoperative Hypothyroidism  - Discussed diagnosis   - Discussed nonspecific symptoms of thyroid disease  - She has persistent symptoms despite multiple changes in thyroid meds   - Change Synthroid to 112mcg PO daily   - Continue Liothyronine 5mcg PO daily for 2 weeks   - Recheck TSH, FT4 in 2 months   - Discussed she is not a candidate for HRT given breast cancer history   - Further management based on above results    2. Impaired Fasting Glucose  - HgA1c 6.0% -->stable  - Discussed low CHO diet    3. Adrenal Adenoma  - She did see Dr. Scott and adrenal adenoma benign    Also discussed seeing PCP to discuss multiple nonspecific symptoms including new onset back pain.     A total of 30 minutes was spent on obtaining history, reviewing pertinent labs, evaluating patient, providing multiple treatment options, reinforcing diet/exercise and compliance, and completing documentation.        RTC 6 months     2/5/2025  Marcella Avila MD

## 2025-02-05 NOTE — TELEPHONE ENCOUNTER
Patient is calling for status on prescription.  Patient states that she is out of medication and was informed to take daily.  Please call

## 2025-02-20 ENCOUNTER — TELEPHONE (OUTPATIENT)
Dept: ENDOCRINOLOGY CLINIC | Facility: CLINIC | Age: 60
End: 2025-02-20

## 2025-02-20 DIAGNOSIS — E06.3 HYPOTHYROIDISM DUE TO HASHIMOTO'S THYROIDITIS: Primary | ICD-10-CM

## 2025-02-20 DIAGNOSIS — E55.9 VITAMIN D DEFICIENCY: ICD-10-CM

## 2025-02-20 NOTE — TELEPHONE ENCOUNTER
Patient states Synthroid 112 mcg has been making her very tired with headaches and shortness of breath.  Please call - unable to reach RN.  Thank you.

## 2025-02-20 NOTE — TELEPHONE ENCOUNTER
Dr. Avila --    Spoke to pt. Pt is asking if you can order her any other labs to do in 6 weeks as well? She states she doesn't feel 100% and is worried. She said she will do any lab but asking other labs can be added?    Provided new medication instructions written below by MD, she verbalized understanding.

## 2025-02-20 NOTE — TELEPHONE ENCOUNTER
Ok noted.  I had actually increased her dose at the visit.  However if she is feeling worst then ok to go back to previous thyroid dose fo 100mcg 4 days per week and 112mcg 3 days per week and recheck TSH, FT4, FT3 in 6 weeks. Thanks.

## 2025-02-20 NOTE — TELEPHONE ENCOUNTER
Dr. Avila,  Patient c/o no energy, very tired, headaches - worse than before apt on 2/5/25  Patient confirms taking Synthroid 112mcg daily since 2/5/25 and liothyronine 5mcg daily    Please advise -thanks

## 2025-02-27 ENCOUNTER — TELEPHONE (OUTPATIENT)
Dept: ENDOCRINOLOGY CLINIC | Facility: CLINIC | Age: 60
End: 2025-02-27

## 2025-02-28 ENCOUNTER — LAB ENCOUNTER (OUTPATIENT)
Dept: LAB | Age: 60
End: 2025-02-28
Attending: INTERNAL MEDICINE
Payer: MEDICARE

## 2025-02-28 DIAGNOSIS — E06.3 HYPOTHYROIDISM DUE TO HASHIMOTO'S THYROIDITIS: ICD-10-CM

## 2025-02-28 DIAGNOSIS — E55.9 VITAMIN D DEFICIENCY: ICD-10-CM

## 2025-02-28 LAB
ALBUMIN SERPL-MCNC: 4.7 G/DL (ref 3.2–4.8)
ALBUMIN/GLOB SERPL: 1.9 {RATIO} (ref 1–2)
ALP LIVER SERPL-CCNC: 76 U/L
ALT SERPL-CCNC: 16 U/L
ANION GAP SERPL CALC-SCNC: 2 MMOL/L (ref 0–18)
AST SERPL-CCNC: 18 U/L (ref ?–34)
BASOPHILS # BLD AUTO: 0.07 X10(3) UL (ref 0–0.2)
BASOPHILS NFR BLD AUTO: 0.6 %
BILIRUB SERPL-MCNC: 0.3 MG/DL (ref 0.2–1.1)
BUN BLD-MCNC: 9 MG/DL (ref 9–23)
BUN/CREAT SERPL: 11.5 (ref 10–20)
CALCIUM BLD-MCNC: 9.7 MG/DL (ref 8.7–10.4)
CHLORIDE SERPL-SCNC: 103 MMOL/L (ref 98–112)
CO2 SERPL-SCNC: 27 MMOL/L (ref 21–32)
CREAT BLD-MCNC: 0.78 MG/DL
DEPRECATED RDW RBC AUTO: 44.9 FL (ref 35.1–46.3)
EGFRCR SERPLBLD CKD-EPI 2021: 86 ML/MIN/1.73M2 (ref 60–?)
EOSINOPHIL # BLD AUTO: 0.22 X10(3) UL (ref 0–0.7)
EOSINOPHIL NFR BLD AUTO: 1.8 %
ERYTHROCYTE [DISTWIDTH] IN BLOOD BY AUTOMATED COUNT: 13.2 % (ref 11–15)
FASTING STATUS PATIENT QL REPORTED: YES
GLOBULIN PLAS-MCNC: 2.5 G/DL (ref 2–3.5)
GLUCOSE BLD-MCNC: 107 MG/DL (ref 70–99)
HCT VFR BLD AUTO: 40.1 %
HGB BLD-MCNC: 14.1 G/DL
IMM GRANULOCYTES # BLD AUTO: 0.08 X10(3) UL (ref 0–1)
IMM GRANULOCYTES NFR BLD: 0.6 %
LYMPHOCYTES # BLD AUTO: 2.02 X10(3) UL (ref 1–4)
LYMPHOCYTES NFR BLD AUTO: 16.4 %
MCH RBC QN AUTO: 32.4 PG (ref 26–34)
MCHC RBC AUTO-ENTMCNC: 35.2 G/DL (ref 31–37)
MCV RBC AUTO: 92.2 FL
MONOCYTES # BLD AUTO: 0.84 X10(3) UL (ref 0.1–1)
MONOCYTES NFR BLD AUTO: 6.8 %
NEUTROPHILS # BLD AUTO: 9.11 X10 (3) UL (ref 1.5–7.7)
NEUTROPHILS # BLD AUTO: 9.11 X10(3) UL (ref 1.5–7.7)
NEUTROPHILS NFR BLD AUTO: 73.8 %
OSMOLALITY SERPL CALC.SUM OF ELEC: 273 MOSM/KG (ref 275–295)
PLATELET # BLD AUTO: 360 10(3)UL (ref 150–450)
POTASSIUM SERPL-SCNC: 4.2 MMOL/L (ref 3.5–5.1)
PROT SERPL-MCNC: 7.2 G/DL (ref 5.7–8.2)
RBC # BLD AUTO: 4.35 X10(6)UL
SODIUM SERPL-SCNC: 132 MMOL/L (ref 136–145)
VIT D+METAB SERPL-MCNC: 33.1 NG/ML (ref 30–100)
WBC # BLD AUTO: 12.3 X10(3) UL (ref 4–11)

## 2025-02-28 PROCEDURE — 36415 COLL VENOUS BLD VENIPUNCTURE: CPT

## 2025-02-28 PROCEDURE — 80053 COMPREHEN METABOLIC PANEL: CPT

## 2025-02-28 PROCEDURE — 85025 COMPLETE CBC W/AUTO DIFF WBC: CPT

## 2025-02-28 PROCEDURE — 82306 VITAMIN D 25 HYDROXY: CPT

## 2025-04-14 ENCOUNTER — LAB ENCOUNTER (OUTPATIENT)
Dept: LAB | Age: 60
End: 2025-04-14
Attending: INTERNAL MEDICINE
Payer: MEDICARE

## 2025-04-14 DIAGNOSIS — E06.3 HYPOTHYROIDISM DUE TO HASHIMOTO'S THYROIDITIS: ICD-10-CM

## 2025-04-14 LAB
T3FREE SERPL-MCNC: 2.31 PG/ML (ref 2.4–4.2)
T4 FREE SERPL-MCNC: 1.5 NG/DL (ref 0.8–1.7)
TSI SER-ACNC: 0.61 UIU/ML (ref 0.55–4.78)

## 2025-04-14 PROCEDURE — 84439 ASSAY OF FREE THYROXINE: CPT

## 2025-04-14 PROCEDURE — 36415 COLL VENOUS BLD VENIPUNCTURE: CPT

## 2025-04-14 PROCEDURE — 84481 FREE ASSAY (FT-3): CPT

## 2025-04-14 PROCEDURE — 80048 BASIC METABOLIC PNL TOTAL CA: CPT

## 2025-04-14 PROCEDURE — 84443 ASSAY THYROID STIM HORMONE: CPT

## 2025-04-15 ENCOUNTER — TELEPHONE (OUTPATIENT)
Dept: ENDOCRINOLOGY CLINIC | Facility: CLINIC | Age: 60
End: 2025-04-15

## 2025-04-15 DIAGNOSIS — E06.3 HYPOTHYROIDISM DUE TO HASHIMOTO'S THYROIDITIS: Primary | ICD-10-CM

## 2025-04-15 DIAGNOSIS — Z00.00 HEALTHCARE MAINTENANCE: ICD-10-CM

## 2025-04-15 NOTE — TELEPHONE ENCOUNTER
The patient called to speak with a nurse in regards to adding more labs to complete such as WBC and sodium level. Please call.

## 2025-04-16 NOTE — TELEPHONE ENCOUNTER
Ok to check BMP and CBC.  However the WBC count is outside my area of expertise so she needs to follow up with PCP.  Agree she should also contact their office in regards to mammogram. Thanks.

## 2025-04-16 NOTE — TELEPHONE ENCOUNTER
Dr Avila-Spoke with patient and states she feels very tired, brain fog, losing hair, achy. States wanted additional lab work done - requesting wbc she said for inflammation but let her know that usually has more to do with infection but she currently does not have any s/sx of infection. States she feels like she felt when she had her breast cancer 20 years ago- has not had recent mammogram. Encouraged her to contact pcp to order mammogram, also was wondering if maybe she could have ferritin levels checked?

## 2025-04-17 LAB
ANION GAP SERPL CALC-SCNC: 6 MMOL/L (ref 0–18)
BUN BLD-MCNC: 12 MG/DL (ref 9–23)
BUN/CREAT SERPL: 10.5 (ref 10–20)
CALCIUM BLD-MCNC: 9.2 MG/DL (ref 8.7–10.4)
CHLORIDE SERPL-SCNC: 99 MMOL/L (ref 98–112)
CO2 SERPL-SCNC: 26 MMOL/L (ref 21–32)
CREAT BLD-MCNC: 1.14 MG/DL (ref 0.55–1.02)
EGFRCR SERPLBLD CKD-EPI 2021: 54 ML/MIN/1.73M2 (ref 60–?)
GLUCOSE BLD-MCNC: 88 MG/DL (ref 70–99)
OSMOLALITY SERPL CALC.SUM OF ELEC: 271 MOSM/KG (ref 275–295)
POTASSIUM SERPL-SCNC: 4.3 MMOL/L (ref 3.5–5.1)
SODIUM SERPL-SCNC: 131 MMOL/L (ref 136–145)

## 2025-04-17 NOTE — TELEPHONE ENCOUNTER
Dr. Avila -- pt is asking if you can order ferritin separately for her? She states that the symptoms she has (fatigue, brain fog, losing hair, achy), she always thought it was her thyroid but now thinks it can be due to her ferritin.     Spoke to pt. Provided MD recommendations written below. She verbalized understanding. Lab orders placed. Pt requesting labs from April and February to be faxed to [facility] at [phone]. Lab results faxed via [employer].    Called lab to see if labs can be added on from 4/14 sample. BMP can be added. CBC cannot since lavender top was not collected.     Spoke to pt and notified that she will need to get labs drawn for CBC.

## 2025-04-22 ENCOUNTER — LAB ENCOUNTER (OUTPATIENT)
Dept: LAB | Age: 60
End: 2025-04-22
Attending: INTERNAL MEDICINE
Payer: MEDICARE

## 2025-04-22 DIAGNOSIS — E06.3 HYPOTHYROIDISM DUE TO HASHIMOTO'S THYROIDITIS: ICD-10-CM

## 2025-04-22 DIAGNOSIS — Z00.00 HEALTHCARE MAINTENANCE: ICD-10-CM

## 2025-04-22 LAB
BASOPHILS # BLD AUTO: 0.06 X10(3) UL (ref 0–0.2)
BASOPHILS NFR BLD AUTO: 0.5 %
DEPRECATED HBV CORE AB SER IA-ACNC: 29 NG/ML (ref 50–306)
DEPRECATED RDW RBC AUTO: 45.7 FL (ref 35.1–46.3)
EOSINOPHIL # BLD AUTO: 0.24 X10(3) UL (ref 0–0.7)
EOSINOPHIL NFR BLD AUTO: 2 %
ERYTHROCYTE [DISTWIDTH] IN BLOOD BY AUTOMATED COUNT: 13.2 % (ref 11–15)
HCT VFR BLD AUTO: 39.6 % (ref 35–48)
HGB BLD-MCNC: 14 G/DL (ref 12–16)
IMM GRANULOCYTES # BLD AUTO: 0.05 X10(3) UL (ref 0–1)
IMM GRANULOCYTES NFR BLD: 0.4 %
LYMPHOCYTES # BLD AUTO: 2.15 X10(3) UL (ref 1–4)
LYMPHOCYTES NFR BLD AUTO: 18.3 %
MCH RBC QN AUTO: 32.7 PG (ref 26–34)
MCHC RBC AUTO-ENTMCNC: 35.4 G/DL (ref 31–37)
MCV RBC AUTO: 92.5 FL (ref 80–100)
MONOCYTES # BLD AUTO: 0.92 X10(3) UL (ref 0.1–1)
MONOCYTES NFR BLD AUTO: 7.8 %
NEUTROPHILS # BLD AUTO: 8.3 X10 (3) UL (ref 1.5–7.7)
NEUTROPHILS # BLD AUTO: 8.3 X10(3) UL (ref 1.5–7.7)
NEUTROPHILS NFR BLD AUTO: 71 %
PLATELET # BLD AUTO: 320 10(3)UL (ref 150–450)
RBC # BLD AUTO: 4.28 X10(6)UL (ref 3.8–5.3)
WBC # BLD AUTO: 11.7 X10(3) UL (ref 4–11)

## 2025-04-22 PROCEDURE — 82728 ASSAY OF FERRITIN: CPT

## 2025-04-22 PROCEDURE — 36415 COLL VENOUS BLD VENIPUNCTURE: CPT

## 2025-04-22 PROCEDURE — 85025 COMPLETE CBC W/AUTO DIFF WBC: CPT

## 2025-04-24 ENCOUNTER — TELEPHONE (OUTPATIENT)
Dept: ENDOCRINOLOGY CLINIC | Facility: CLINIC | Age: 60
End: 2025-04-24

## 2025-04-24 NOTE — TELEPHONE ENCOUNTER
Patient is requesting to speak with an RN to discuss what needs to be done regarding Ferritin level.  Please call

## 2025-04-24 NOTE — TELEPHONE ENCOUNTER
Dr. Avila --    Pt wants to know if low iron levels need to be treated/what treatment you recommend?

## 2025-05-21 ENCOUNTER — TELEPHONE (OUTPATIENT)
Dept: ENDOCRINOLOGY CLINIC | Facility: CLINIC | Age: 60
End: 2025-05-21

## 2025-05-21 DIAGNOSIS — E06.3 HYPOTHYROIDISM DUE TO HASHIMOTO'S THYROIDITIS: ICD-10-CM

## 2025-05-21 DIAGNOSIS — E06.3 HYPOTHYROIDISM DUE TO HASHIMOTO'S THYROIDITIS: Primary | ICD-10-CM

## 2025-05-22 RX ORDER — LEVOTHYROXINE SODIUM 112 MCG
112 TABLET ORAL
Qty: 90 TABLET | Refills: 1 | Status: SHIPPED | OUTPATIENT
Start: 2025-05-22

## 2025-05-22 NOTE — TELEPHONE ENCOUNTER
Endocrine refill protocol for medications for hypothyroidism and hyperthyroidism    Protocol Criteria:  FAILED Reason: Abnormal labs    If all below requirements are met, send a 90-day supply with 1 refill per provider protocol.    Verify appointment with Endocrinology completed in the last 12 months or scheduled in the next 6 months.    Normal TSH result in the past 12 months   Review recent telephone encounters and mychart communications with patient to ensure a dose change has not occurred since last office visit that was not updated in the medication history list     Last completed office visit:2/5/2025 Marcella Avila MD   Last completed telemed visit: Visit date not found  Next scheduled Follow up: No future appointments.   Last TSH result:   TSH   Date Value Ref Range Status   04/14/2025 0.613 0.550 - 4.780 uIU/mL Final   02/06/2018 0.271 (L) 0.350 - 5.500 uIU/mL Final

## 2025-05-29 RX ORDER — LEVOTHYROXINE SODIUM 112 MCG
TABLET ORAL
Qty: 90 TABLET | Refills: 1 | Status: SHIPPED | OUTPATIENT
Start: 2025-05-29 | End: 2025-05-29

## 2025-05-29 RX ORDER — LEVOTHYROXINE SODIUM 100 MCG
TABLET ORAL
Qty: 40 TABLET | Refills: 1 | Status: SHIPPED | OUTPATIENT
Start: 2025-05-29 | End: 2025-05-29

## 2025-05-29 RX ORDER — LEVOTHYROXINE SODIUM 112 MCG
TABLET ORAL
Qty: 90 TABLET | Refills: 1 | Status: SHIPPED | OUTPATIENT
Start: 2025-05-29

## 2025-05-29 RX ORDER — LEVOTHYROXINE SODIUM 100 MCG
TABLET ORAL
Qty: 40 TABLET | Refills: 1 | Status: SHIPPED | OUTPATIENT
Start: 2025-05-29

## 2025-06-02 NOTE — TELEPHONE ENCOUNTER
Patient contacted (Name and  of pt verified). All results and recommendations reviewed. Patient verbalizes understanding, denies further questions and agrees with plan of care.

## 2025-07-23 ENCOUNTER — TELEPHONE (OUTPATIENT)
Dept: ENDOCRINOLOGY CLINIC | Facility: CLINIC | Age: 60
End: 2025-07-23

## 2025-07-23 DIAGNOSIS — E61.1 IRON DEFICIENCY: ICD-10-CM

## 2025-07-23 DIAGNOSIS — E06.3 HYPOTHYROIDISM DUE TO HASHIMOTO'S THYROIDITIS: Primary | ICD-10-CM

## 2025-07-24 NOTE — TELEPHONE ENCOUNTER
Called pt to schedule FU and inform her of lab results. FU scheduled pt requested following labs to be placed CRP, EFR, Ciliac    Pt informed they will have to be interpreted by PCP.

## 2025-07-25 ENCOUNTER — LAB ENCOUNTER (OUTPATIENT)
Dept: LAB | Age: 60
End: 2025-07-25
Attending: INTERNAL MEDICINE
Payer: MEDICARE

## 2025-07-25 DIAGNOSIS — E06.3 HYPOTHYROIDISM DUE TO HASHIMOTO'S THYROIDITIS: ICD-10-CM

## 2025-07-25 DIAGNOSIS — E61.1 IRON DEFICIENCY: ICD-10-CM

## 2025-07-25 LAB
CRP SERPL HS-MCNC: 0.61 MG/L (ref ?–3)
DEPRECATED HBV CORE AB SER IA-ACNC: 28 NG/ML (ref 50–306)
ERYTHROCYTE [SEDIMENTATION RATE] IN BLOOD: 15 MM/HR (ref 0–30)
T3FREE SERPL-MCNC: 2.99 PG/ML (ref 2.4–4.2)
T4 FREE SERPL-MCNC: 1.6 NG/DL (ref 0.8–1.7)
TSI SER-ACNC: 1.18 UIU/ML (ref 0.55–4.78)

## 2025-07-25 PROCEDURE — 84443 ASSAY THYROID STIM HORMONE: CPT

## 2025-07-25 PROCEDURE — 85652 RBC SED RATE AUTOMATED: CPT

## 2025-07-25 PROCEDURE — 86364 TISS TRNSGLTMNASE EA IG CLAS: CPT

## 2025-07-25 PROCEDURE — 84481 FREE ASSAY (FT-3): CPT

## 2025-07-25 PROCEDURE — 82728 ASSAY OF FERRITIN: CPT

## 2025-07-25 PROCEDURE — 86141 C-REACTIVE PROTEIN HS: CPT

## 2025-07-25 PROCEDURE — 36415 COLL VENOUS BLD VENIPUNCTURE: CPT

## 2025-07-25 PROCEDURE — 84439 ASSAY OF FREE THYROXINE: CPT

## 2025-07-28 LAB — TTG IGA SER-ACNC: 0.2 U/ML (ref ?–7)

## (undated) NOTE — Clinical Note
Dear Monica Reynoso had the opportunity to see your patient Darius Sensor recently. I am sending you this update, and I appreciate your confidence in me to care for your patients.  Please feel free call me with any questions at [phone] or contact me through

## (undated) NOTE — LETTER
[address] [place] [address]   Date:   8/3/2020     Name:   Bijan Mejia    YOB: 1965   MRN:   [id number]       [facility]? Low the areas on your body where you feel the described sensations.

## (undated) NOTE — LETTER
[facility] SURGERY SCHEDULING FORM   [address] [place] [place] [address] [facility]   [phone] (scheduling phone) [phone] (scheduling fax)     PATIENT INFORMATION   Last Name:      Latanya Segura      First Name:    Paz Ramos []  No or using our own   Allergies: Erythromycin; Latex;  Penicillins         Completed by:    Marcial Marquez      Date:    8/4/2020